# Patient Record
Sex: FEMALE | Race: OTHER | HISPANIC OR LATINO | URBAN - METROPOLITAN AREA
[De-identification: names, ages, dates, MRNs, and addresses within clinical notes are randomized per-mention and may not be internally consistent; named-entity substitution may affect disease eponyms.]

---

## 2024-11-04 ENCOUNTER — EMERGENCY (EMERGENCY)
Facility: HOSPITAL | Age: 19
LOS: 1 days | Discharge: HOME | End: 2024-11-04
Attending: STUDENT IN AN ORGANIZED HEALTH CARE EDUCATION/TRAINING PROGRAM
Payer: COMMERCIAL

## 2024-11-04 VITALS
DIASTOLIC BLOOD PRESSURE: 60 MMHG | HEIGHT: 61.81 IN | TEMPERATURE: 98 F | WEIGHT: 128.75 LBS | OXYGEN SATURATION: 99 % | SYSTOLIC BLOOD PRESSURE: 101 MMHG | HEART RATE: 71 BPM | RESPIRATION RATE: 16 BRPM

## 2024-11-04 VITALS
OXYGEN SATURATION: 97 % | DIASTOLIC BLOOD PRESSURE: 63 MMHG | TEMPERATURE: 99 F | HEART RATE: 64 BPM | SYSTOLIC BLOOD PRESSURE: 105 MMHG | RESPIRATION RATE: 16 BRPM

## 2024-11-04 PROBLEM — Z00.00 ENCOUNTER FOR PREVENTIVE HEALTH EXAMINATION: Status: ACTIVE | Noted: 2024-11-04

## 2024-11-04 LAB
ALBUMIN SERPL ELPH-MCNC: 4 G/DL — SIGNIFICANT CHANGE UP (ref 3.3–5.2)
ALP SERPL-CCNC: 72 U/L — SIGNIFICANT CHANGE UP (ref 40–120)
ALT FLD-CCNC: 11 U/L — SIGNIFICANT CHANGE UP
ANION GAP SERPL CALC-SCNC: 12 MMOL/L — SIGNIFICANT CHANGE UP (ref 5–17)
APPEARANCE UR: CLEAR — SIGNIFICANT CHANGE UP
AST SERPL-CCNC: 16 U/L — SIGNIFICANT CHANGE UP
BASOPHILS # BLD AUTO: 0.03 K/UL — SIGNIFICANT CHANGE UP (ref 0–0.2)
BASOPHILS NFR BLD AUTO: 0.4 % — SIGNIFICANT CHANGE UP (ref 0–2)
BILIRUB SERPL-MCNC: 0.5 MG/DL — SIGNIFICANT CHANGE UP (ref 0.4–2)
BILIRUB UR-MCNC: NEGATIVE — SIGNIFICANT CHANGE UP
BLD GP AB SCN SERPL QL: SIGNIFICANT CHANGE UP
BUN SERPL-MCNC: 7.4 MG/DL — LOW (ref 8–20)
CALCIUM SERPL-MCNC: 8.4 MG/DL — SIGNIFICANT CHANGE UP (ref 8.4–10.5)
CHLORIDE SERPL-SCNC: 102 MMOL/L — SIGNIFICANT CHANGE UP (ref 96–108)
CO2 SERPL-SCNC: 23 MMOL/L — SIGNIFICANT CHANGE UP (ref 22–29)
COLOR SPEC: YELLOW — SIGNIFICANT CHANGE UP
CREAT SERPL-MCNC: 0.5 MG/DL — SIGNIFICANT CHANGE UP (ref 0.5–1.3)
DIFF PNL FLD: NEGATIVE — SIGNIFICANT CHANGE UP
EGFR: 138 ML/MIN/1.73M2 — SIGNIFICANT CHANGE UP
EOSINOPHIL # BLD AUTO: 0.34 K/UL — SIGNIFICANT CHANGE UP (ref 0–0.5)
EOSINOPHIL NFR BLD AUTO: 4.8 % — SIGNIFICANT CHANGE UP (ref 0–6)
GLUCOSE SERPL-MCNC: 91 MG/DL — SIGNIFICANT CHANGE UP (ref 70–99)
GLUCOSE UR QL: NEGATIVE MG/DL — SIGNIFICANT CHANGE UP
HCG SERPL-ACNC: 6866 MIU/ML — HIGH
HCT VFR BLD CALC: 36.9 % — SIGNIFICANT CHANGE UP (ref 34.5–45)
HGB BLD-MCNC: 12.8 G/DL — SIGNIFICANT CHANGE UP (ref 11.5–15.5)
IMM GRANULOCYTES NFR BLD AUTO: 0.4 % — SIGNIFICANT CHANGE UP (ref 0–0.9)
KETONES UR-MCNC: NEGATIVE MG/DL — SIGNIFICANT CHANGE UP
LEUKOCYTE ESTERASE UR-ACNC: NEGATIVE — SIGNIFICANT CHANGE UP
LYMPHOCYTES # BLD AUTO: 1.9 K/UL — SIGNIFICANT CHANGE UP (ref 1–3.3)
LYMPHOCYTES # BLD AUTO: 26.7 % — SIGNIFICANT CHANGE UP (ref 13–44)
MCHC RBC-ENTMCNC: 30.1 PG — SIGNIFICANT CHANGE UP (ref 27–34)
MCHC RBC-ENTMCNC: 34.7 G/DL — SIGNIFICANT CHANGE UP (ref 32–36)
MCV RBC AUTO: 86.8 FL — SIGNIFICANT CHANGE UP (ref 80–100)
MONOCYTES # BLD AUTO: 0.51 K/UL — SIGNIFICANT CHANGE UP (ref 0–0.9)
MONOCYTES NFR BLD AUTO: 7.2 % — SIGNIFICANT CHANGE UP (ref 2–14)
NEUTROPHILS # BLD AUTO: 4.31 K/UL — SIGNIFICANT CHANGE UP (ref 1.8–7.4)
NEUTROPHILS NFR BLD AUTO: 60.5 % — SIGNIFICANT CHANGE UP (ref 43–77)
NITRITE UR-MCNC: NEGATIVE — SIGNIFICANT CHANGE UP
PH UR: 7.5 — SIGNIFICANT CHANGE UP (ref 5–8)
PLATELET # BLD AUTO: 272 K/UL — SIGNIFICANT CHANGE UP (ref 150–400)
POTASSIUM SERPL-MCNC: 3.5 MMOL/L — SIGNIFICANT CHANGE UP (ref 3.5–5.3)
POTASSIUM SERPL-SCNC: 3.5 MMOL/L — SIGNIFICANT CHANGE UP (ref 3.5–5.3)
PROT SERPL-MCNC: 6.5 G/DL — LOW (ref 6.6–8.7)
PROT UR-MCNC: NEGATIVE MG/DL — SIGNIFICANT CHANGE UP
RBC # BLD: 4.25 M/UL — SIGNIFICANT CHANGE UP (ref 3.8–5.2)
RBC # FLD: 12.4 % — SIGNIFICANT CHANGE UP (ref 10.3–14.5)
SODIUM SERPL-SCNC: 137 MMOL/L — SIGNIFICANT CHANGE UP (ref 135–145)
SP GR SPEC: 1.01 — SIGNIFICANT CHANGE UP (ref 1–1.03)
UROBILINOGEN FLD QL: 1 MG/DL — SIGNIFICANT CHANGE UP (ref 0.2–1)
WBC # BLD: 7.12 K/UL — SIGNIFICANT CHANGE UP (ref 3.8–10.5)
WBC # FLD AUTO: 7.12 K/UL — SIGNIFICANT CHANGE UP (ref 3.8–10.5)

## 2024-11-04 PROCEDURE — 85025 COMPLETE CBC W/AUTO DIFF WBC: CPT

## 2024-11-04 PROCEDURE — 76817 TRANSVAGINAL US OBSTETRIC: CPT | Mod: 26

## 2024-11-04 PROCEDURE — 81003 URINALYSIS AUTO W/O SCOPE: CPT

## 2024-11-04 PROCEDURE — 99284 EMERGENCY DEPT VISIT MOD MDM: CPT

## 2024-11-04 PROCEDURE — 86900 BLOOD TYPING SEROLOGIC ABO: CPT

## 2024-11-04 PROCEDURE — 84702 CHORIONIC GONADOTROPIN TEST: CPT

## 2024-11-04 PROCEDURE — 87086 URINE CULTURE/COLONY COUNT: CPT

## 2024-11-04 PROCEDURE — 80053 COMPREHEN METABOLIC PANEL: CPT

## 2024-11-04 PROCEDURE — 99284 EMERGENCY DEPT VISIT MOD MDM: CPT | Mod: 25

## 2024-11-04 PROCEDURE — 86901 BLOOD TYPING SEROLOGIC RH(D): CPT

## 2024-11-04 PROCEDURE — T1013: CPT

## 2024-11-04 PROCEDURE — 96374 THER/PROPH/DIAG INJ IV PUSH: CPT

## 2024-11-04 PROCEDURE — 76817 TRANSVAGINAL US OBSTETRIC: CPT

## 2024-11-04 PROCEDURE — 76801 OB US < 14 WKS SINGLE FETUS: CPT

## 2024-11-04 PROCEDURE — 36415 COLL VENOUS BLD VENIPUNCTURE: CPT

## 2024-11-04 PROCEDURE — 76801 OB US < 14 WKS SINGLE FETUS: CPT | Mod: 26

## 2024-11-04 PROCEDURE — 86850 RBC ANTIBODY SCREEN: CPT

## 2024-11-04 RX ORDER — METOCLOPRAMIDE HCL 10 MG
10 TABLET ORAL ONCE
Refills: 0 | Status: COMPLETED | OUTPATIENT
Start: 2024-11-04 | End: 2024-11-04

## 2024-11-04 RX ORDER — ACETAMINOPHEN 500 MG
650 TABLET ORAL ONCE
Refills: 0 | Status: COMPLETED | OUTPATIENT
Start: 2024-11-04 | End: 2024-11-04

## 2024-11-04 RX ADMIN — Medication 650 MILLIGRAM(S): at 16:45

## 2024-11-04 RX ADMIN — Medication 10 MILLIGRAM(S): at 16:45

## 2024-11-04 NOTE — ED ADULT NURSE NOTE - NS ED NURSE IV DC DT
DC Summary sent to Tammie Vegas from [facility] via encrypted e-mail.   04-Nov-2024 22:46

## 2024-11-04 NOTE — ED PROVIDER NOTE - ATTENDING APP SHARED VISIT CONTRIBUTION OF CARE
18yo female  LMP beginning of september presents with lower abd pain and nausea. PT with +home preg test. pt with early pregnancy well appearing, symptomatically improved, follow up with OB given return precautions

## 2024-11-04 NOTE — ED PROVIDER NOTE - NSFOLLOWUPINSTRUCTIONS_ED_ALL_ED_FT
Take Tylenol over the counter for pain as needed. Do not take ibuprofen. Start taking prenatal vitamins daily. Call to make an appointment to follow up with your OBGYN within 1 week. Return to ER if you develop severe pain, heavy vaginal bleeding or other worsening symptoms.     [place] Tylenol sin receta para el dolor según sea necesario. No tome ibuprofeno. Empiece a benigno vitaminas prenatales a diario. Llame para programar hal bart para realizar un seguimiento con randall obstetra y ginecólogo dentro de 1 semana. Regrese a la gwendolyn de emergencias si presenta dolor intenso, sangrado vaginal abundante u otros síntomas que empeoran.

## 2024-11-04 NOTE — ED PROVIDER NOTE - PATIENT PORTAL LINK FT
You can access the FollowMyHealth Patient Portal offered by [facility] by registering at the following website: [email or web address]. By joining [employer]’s FollowMyHealth portal, you will also be able to view your health information using other applications (apps) compatible with our system.

## 2024-11-04 NOTE — ED ADULT NURSE NOTE - DATE/TIME OF ACCEPTANCE
[FreeTextEntry1] : IMPRESSION:\par 1. Probable syncopal episode\par 1. Chronic atrial fibrillation with moderate ventricular response \par 2. History of mitral and tricuspid insufficiency by echocardiography\par 3. hypertension well-controlled \par \par  Plan:\par 1.Patient will phone me to advise of her normal beta blocker dose at which point will decrease again in half for the time being\par 2. Have discussed the issue of electrophysiologic study with an eye towards pacing and patient is agreeable.
04-Nov-2024 15:52

## 2024-11-04 NOTE — ED PROVIDER NOTE - OBJECTIVE STATEMENT
18 y/o pregnant female, LMP beginning of 2024, , with PMHx of fibroadenomas s/p left breast surgery, presents with headache and lower abdominal pain. +Nausea. Reports positive home pregnancy test yesterday. Has appt with OBGYN in 3 weeks (). Denies fever, chills, dizziness, shortness of breath, chest pain, vomiting, dysuria, hematuria or vaginal bleeding.

## 2024-11-04 NOTE — ED PROVIDER NOTE - PROGRESS NOTE DETAILS
Lab results reviewed: hcg 6866, CMP with no significant abnormalities; CBC wnl; UA negative for blood or infection. Sono shows intrauterine gestational sac. Likely early pregnancy. Patient well appearing, NAD, non-toxic appearing. Patient reports improvement in symptoms. No further ED workup indicated at this time. Supportive care. Encouraged to take daily prenatal vitamins - patient declined prescription. Follow up with obgyn within 1 week. Return precautions discussed. Patient verbally demonstrated understanding of results and plan. Patient stable for discharge.

## 2024-11-04 NOTE — ED ADULT TRIAGE NOTE - CHIEF COMPLAINT QUOTE
Pt with LMP in the beginning of Sept and positive pregnancy test comes in today c/o lower abdominal cramping. Denies bleeding.

## 2024-11-04 NOTE — ED ADULT NURSE NOTE - OBJECTIVE STATEMENT
Pt A&OX4. Came in w/ c/o lower abdominal pain and headache, LMP early september as per patient. Pt had an at home positive pregnancy test. Denies bleeding, NV/D, urinary symptoms, chest pain, palpitations, SOB. VS stable, RR even and unlabored, safety maintained.

## 2024-11-04 NOTE — ED PROVIDER NOTE - CLINICAL SUMMARY MEDICAL DECISION MAKING FREE TEXT BOX
20 y/o pregnant female, LMP beginning of 2024, , with PMHx of fibroadenomas s/p left breast surgery, presents with headache and lower abdominal pain. Abdomen soft, non-tender. Will treat symptomatically and obtain labs/sono. Reassess.

## 2024-11-06 LAB
CULTURE RESULTS: SIGNIFICANT CHANGE UP
SPECIMEN SOURCE: SIGNIFICANT CHANGE UP

## 2024-11-07 DIAGNOSIS — R11.0 NAUSEA: ICD-10-CM

## 2024-11-07 DIAGNOSIS — R51.9 HEADACHE, UNSPECIFIED: ICD-10-CM

## 2024-11-07 DIAGNOSIS — R10.30 LOWER ABDOMINAL PAIN, UNSPECIFIED: ICD-10-CM

## 2024-11-07 DIAGNOSIS — O20.8 OTHER HEMORRHAGE IN EARLY PREGNANCY: ICD-10-CM

## 2024-11-07 DIAGNOSIS — O26.891 OTHER SPECIFIED PREGNANCY RELATED CONDITIONS, FIRST TRIMESTER: ICD-10-CM

## 2024-11-07 DIAGNOSIS — Z3A.00 WEEKS OF GESTATION OF PREGNANCY NOT SPECIFIED: ICD-10-CM

## 2024-11-12 ENCOUNTER — EMERGENCY (EMERGENCY)
Facility: HOSPITAL | Age: 19
LOS: 1 days | Discharge: HOME | End: 2024-11-12
Attending: EMERGENCY MEDICINE
Payer: COMMERCIAL

## 2024-11-12 VITALS
TEMPERATURE: 97 F | DIASTOLIC BLOOD PRESSURE: 71 MMHG | OXYGEN SATURATION: 98 % | HEART RATE: 80 BPM | RESPIRATION RATE: 16 BRPM | HEIGHT: 61.81 IN | SYSTOLIC BLOOD PRESSURE: 111 MMHG | WEIGHT: 113.54 LBS

## 2024-11-12 LAB
ALBUMIN SERPL ELPH-MCNC: 4.3 G/DL — SIGNIFICANT CHANGE UP (ref 3.3–5.2)
ALP SERPL-CCNC: 70 U/L — SIGNIFICANT CHANGE UP (ref 40–120)
ALT FLD-CCNC: 10 U/L — SIGNIFICANT CHANGE UP
ANION GAP SERPL CALC-SCNC: 14 MMOL/L — SIGNIFICANT CHANGE UP (ref 5–17)
APPEARANCE UR: CLEAR — SIGNIFICANT CHANGE UP
AST SERPL-CCNC: 14 U/L — SIGNIFICANT CHANGE UP
BACTERIA # UR AUTO: ABNORMAL /HPF
BASOPHILS # BLD AUTO: 0.04 K/UL — SIGNIFICANT CHANGE UP (ref 0–0.2)
BASOPHILS NFR BLD AUTO: 0.4 % — SIGNIFICANT CHANGE UP (ref 0–2)
BILIRUB SERPL-MCNC: 0.5 MG/DL — SIGNIFICANT CHANGE UP (ref 0.4–2)
BILIRUB UR-MCNC: NEGATIVE — SIGNIFICANT CHANGE UP
BUN SERPL-MCNC: 7.9 MG/DL — LOW (ref 8–20)
CALCIUM SERPL-MCNC: 9.3 MG/DL — SIGNIFICANT CHANGE UP (ref 8.4–10.5)
CAST: 0 /LPF — SIGNIFICANT CHANGE UP (ref 0–4)
CHLORIDE SERPL-SCNC: 103 MMOL/L — SIGNIFICANT CHANGE UP (ref 96–108)
CO2 SERPL-SCNC: 22 MMOL/L — SIGNIFICANT CHANGE UP (ref 22–29)
COLOR SPEC: YELLOW — SIGNIFICANT CHANGE UP
CREAT SERPL-MCNC: 0.46 MG/DL — LOW (ref 0.5–1.3)
DIFF PNL FLD: NEGATIVE — SIGNIFICANT CHANGE UP
EGFR: 140 ML/MIN/1.73M2 — SIGNIFICANT CHANGE UP
EOSINOPHIL # BLD AUTO: 0.48 K/UL — SIGNIFICANT CHANGE UP (ref 0–0.5)
EOSINOPHIL NFR BLD AUTO: 4.8 % — SIGNIFICANT CHANGE UP (ref 0–6)
GLUCOSE SERPL-MCNC: 84 MG/DL — SIGNIFICANT CHANGE UP (ref 70–99)
GLUCOSE UR QL: NEGATIVE MG/DL — SIGNIFICANT CHANGE UP
HCG SERPL-ACNC: HIGH MIU/ML
HCT VFR BLD CALC: 40.3 % — SIGNIFICANT CHANGE UP (ref 34.5–45)
HGB BLD-MCNC: 14 G/DL — SIGNIFICANT CHANGE UP (ref 11.5–15.5)
IMM GRANULOCYTES NFR BLD AUTO: 0.8 % — SIGNIFICANT CHANGE UP (ref 0–0.9)
KETONES UR-MCNC: NEGATIVE MG/DL — SIGNIFICANT CHANGE UP
LEUKOCYTE ESTERASE UR-ACNC: ABNORMAL
LIDOCAIN IGE QN: 21 U/L — LOW (ref 22–51)
LYMPHOCYTES # BLD AUTO: 1.98 K/UL — SIGNIFICANT CHANGE UP (ref 1–3.3)
LYMPHOCYTES # BLD AUTO: 19.6 % — SIGNIFICANT CHANGE UP (ref 13–44)
MCHC RBC-ENTMCNC: 30 PG — SIGNIFICANT CHANGE UP (ref 27–34)
MCHC RBC-ENTMCNC: 34.7 G/DL — SIGNIFICANT CHANGE UP (ref 32–36)
MCV RBC AUTO: 86.3 FL — SIGNIFICANT CHANGE UP (ref 80–100)
MONOCYTES # BLD AUTO: 0.5 K/UL — SIGNIFICANT CHANGE UP (ref 0–0.9)
MONOCYTES NFR BLD AUTO: 5 % — SIGNIFICANT CHANGE UP (ref 2–14)
NEUTROPHILS # BLD AUTO: 7.02 K/UL — SIGNIFICANT CHANGE UP (ref 1.8–7.4)
NEUTROPHILS NFR BLD AUTO: 69.4 % — SIGNIFICANT CHANGE UP (ref 43–77)
NITRITE UR-MCNC: NEGATIVE — SIGNIFICANT CHANGE UP
PH UR: 6 — SIGNIFICANT CHANGE UP (ref 5–8)
PLATELET # BLD AUTO: 278 K/UL — SIGNIFICANT CHANGE UP (ref 150–400)
POTASSIUM SERPL-MCNC: 4.2 MMOL/L — SIGNIFICANT CHANGE UP (ref 3.5–5.3)
POTASSIUM SERPL-SCNC: 4.2 MMOL/L — SIGNIFICANT CHANGE UP (ref 3.5–5.3)
PROT SERPL-MCNC: 7.2 G/DL — SIGNIFICANT CHANGE UP (ref 6.6–8.7)
PROT UR-MCNC: SIGNIFICANT CHANGE UP MG/DL
RBC # BLD: 4.67 M/UL — SIGNIFICANT CHANGE UP (ref 3.8–5.2)
RBC # FLD: 12.6 % — SIGNIFICANT CHANGE UP (ref 10.3–14.5)
RBC CASTS # UR COMP ASSIST: 2 /HPF — SIGNIFICANT CHANGE UP (ref 0–4)
SODIUM SERPL-SCNC: 138 MMOL/L — SIGNIFICANT CHANGE UP (ref 135–145)
SP GR SPEC: 1.03 — SIGNIFICANT CHANGE UP (ref 1–1.03)
SQUAMOUS # UR AUTO: 13 /HPF — HIGH (ref 0–5)
UROBILINOGEN FLD QL: 1 MG/DL — SIGNIFICANT CHANGE UP (ref 0.2–1)
WBC # BLD: 10.1 K/UL — SIGNIFICANT CHANGE UP (ref 3.8–10.5)
WBC # FLD AUTO: 10.1 K/UL — SIGNIFICANT CHANGE UP (ref 3.8–10.5)
WBC UR QL: 12 /HPF — HIGH (ref 0–5)

## 2024-11-12 PROCEDURE — 76801 OB US < 14 WKS SINGLE FETUS: CPT | Mod: 26

## 2024-11-12 PROCEDURE — 36415 COLL VENOUS BLD VENIPUNCTURE: CPT

## 2024-11-12 PROCEDURE — 80053 COMPREHEN METABOLIC PANEL: CPT

## 2024-11-12 PROCEDURE — 85025 COMPLETE CBC W/AUTO DIFF WBC: CPT

## 2024-11-12 PROCEDURE — 76801 OB US < 14 WKS SINGLE FETUS: CPT

## 2024-11-12 PROCEDURE — 83690 ASSAY OF LIPASE: CPT

## 2024-11-12 PROCEDURE — T1013: CPT

## 2024-11-12 PROCEDURE — 76817 TRANSVAGINAL US OBSTETRIC: CPT

## 2024-11-12 PROCEDURE — 99284 EMERGENCY DEPT VISIT MOD MDM: CPT

## 2024-11-12 PROCEDURE — 76817 TRANSVAGINAL US OBSTETRIC: CPT | Mod: 26

## 2024-11-12 PROCEDURE — 84702 CHORIONIC GONADOTROPIN TEST: CPT

## 2024-11-12 PROCEDURE — 81001 URINALYSIS AUTO W/SCOPE: CPT

## 2024-11-12 RX ORDER — ONDANSETRON HYDROCHLORIDE 2 MG/ML
4 INJECTION, SOLUTION INTRAMUSCULAR; INTRAVENOUS ONCE
Refills: 0 | Status: COMPLETED | OUTPATIENT
Start: 2024-11-12 | End: 2024-11-12

## 2024-11-12 RX ORDER — CEPHALEXIN 125 MG/5ML
1 SUSPENSION, RECONSTITUTED, ORAL (ML) ORAL
Qty: 20 | Refills: 0
Start: 2024-11-12 | End: 2024-11-16

## 2024-11-12 RX ORDER — ALUMINA, MAGNESIA, AND SIMETHICONE 2400; 2400; 240 MG/30ML; MG/30ML; MG/30ML
1 SUSPENSION ORAL
Qty: 12 | Refills: 0
Start: 2024-11-12 | End: 2024-11-14

## 2024-11-12 RX ORDER — CEPHALEXIN 125 MG/5ML
500 SUSPENSION, RECONSTITUTED, ORAL (ML) ORAL
Refills: 0 | Status: DISCONTINUED | OUTPATIENT
Start: 2024-11-12 | End: 2024-11-20

## 2024-11-12 RX ORDER — ONDANSETRON HYDROCHLORIDE 2 MG/ML
1 INJECTION, SOLUTION INTRAMUSCULAR; INTRAVENOUS
Qty: 12 | Refills: 0
Start: 2024-11-12 | End: 2024-11-14

## 2024-11-12 RX ORDER — MAGNESIUM, ALUMINUM HYDROXIDE 200-200 MG
30 TABLET,CHEWABLE ORAL ONCE
Refills: 0 | Status: COMPLETED | OUTPATIENT
Start: 2024-11-12 | End: 2024-11-12

## 2024-11-12 RX ORDER — SODIUM CHLORIDE 9 MG/ML
1000 INJECTION, SOLUTION INTRAMUSCULAR; INTRAVENOUS; SUBCUTANEOUS ONCE
Refills: 0 | Status: COMPLETED | OUTPATIENT
Start: 2024-11-12 | End: 2024-11-12

## 2024-11-12 RX ORDER — ACETAMINOPHEN 500 MG
650 TABLET ORAL ONCE
Refills: 0 | Status: COMPLETED | OUTPATIENT
Start: 2024-11-12 | End: 2024-11-12

## 2024-11-12 RX ORDER — CEPHALEXIN 500 MG
1 CAPSULE ORAL
Qty: 20 | Refills: 0
Start: 2024-11-12 | End: 2024-12-27

## 2024-11-12 RX ADMIN — SODIUM CHLORIDE 1000 MILLILITER(S): 9 INJECTION, SOLUTION INTRAMUSCULAR; INTRAVENOUS; SUBCUTANEOUS at 17:39

## 2024-11-12 RX ADMIN — Medication 30 MILLILITER(S): at 13:29

## 2024-11-12 RX ADMIN — ONDANSETRON HYDROCHLORIDE 4 MILLIGRAM(S): 2 INJECTION, SOLUTION INTRAMUSCULAR; INTRAVENOUS at 13:29

## 2024-11-12 RX ADMIN — Medication 650 MILLIGRAM(S): at 13:28

## 2024-11-12 RX ADMIN — Medication 500 MILLIGRAM(S): at 18:44

## 2024-11-12 NOTE — ED PROVIDER NOTE - CLINICAL SUMMARY MEDICAL DECISION MAKING FREE TEXT BOX
PT with SPMHX being approx 5wk preg LMP beginning of Sept 2024,    presents to the ED with complaint of upper abd pain. Pt states that she has been having intermient nausea over the last wk but that today and yesterday she has been experiencing increased upper abd pain and nausea. Pt dines  vag bleeding, vag discharge, urinary symptoms, abd cramping, Pt with no acute findings on exam, Pt trialed on PO meds. PT with SPMHX being approx 5wk preg LMP beginning of Sept 2024,    presents to the ED with complaint of upper abd pain. Pt states that she has been having interment nausea over the last wk but that today and yesterday she has been experiencing increased upper abd pain and nausea. Pt dines  vag bleeding, vag discharge, urinary symptoms, abd cramping, Pt with no acute findings on exam,  Pt with IUP, UTI that will be dc home on PO Abx, Pt informed that they will be contacted if after culture is resulted there is a need for change in ABx or return to the ED. PT with stable VS, no acute distress, non toxic appearing, tolerating PO in the ED, Pt dc home with supportive care, followup to PCP, Pt educated about when to return to the ED if needed. PT verbalizes that he understands all instructions and results. Pt informed that ED is open and available 24/7 365 days a yr, encouraged to return to the ED if they have any change in condition, or feel the need for revaluation.    utilized to obtain History, ROS, Physical Exam, explanations of results and plan of care, as well as follow up instructions.

## 2024-11-12 NOTE — ED PROVIDER NOTE - OBJECTIVE STATEMENT
PT with SPMHX being approx 5wk preg LMP beginning of Sept 2024,    presents to the ED with complaint of upper abd pain. Pt states that she has been having intermient nausea over the last wk but that today and yesterday she has been experiencing increased upper abd pain and nausea. Pt dines  vag bleeding, vag discharge, urinary symptoms, abd cramping, PT with SPMHX being approx 5wk preg LMP beginning of Sept 2024,    presents to the ED with complaint of upper abd pain. Pt states that she has been having interment nausea over the last wk but that today and yesterday she has been experiencing increased upper abd pain and nausea. Pt dines  vag bleeding, vag discharge, urinary symptoms, abd cramping,

## 2024-11-12 NOTE — ED PROVIDER NOTE - NSFOLLOWUPINSTRUCTIONS_ED_ALL_ED_FT
Patient education: Nausea and vomiting in babies and children (The Basics)  Written by the doctors and editors at [facility]  Please read the Disclaimer at the end of this page.    What are nausea and vomiting?    Nausea is the feeling your child has when they think that they might throw up. Your child might say that they have a "stomach ache" or feel "sick to their stomach." Vomiting is when they actually throw up.    Vomiting is different than spitting up, but, sometimes, people use these terms interchangeably. Babies often spit up with a burp after a feeding. This is known as "regurgitation" or "reflux."    What can cause nausea and vomiting?    Nausea and vomiting are common in children. They are usually part of a mild, short-term illness, often caused by a virus. The possible causes of vomiting depend on the child's age.    In newborns and very young babies (under 3 months old):    ?Spitting up is common in babies and is normal. Vomiting is usually more forceful, and the baby might seem sick.    ?Repeated vomiting with force, or having symptoms such as a fever of 100.4°F (38°C) or higher, can be a sign of a serious problem. Examples include a blockage in the stomach or intestine, or an infection.    In older babies, children, and teens:    ?The most common cause is an infection of the stomach and intestines, usually caused by a virus. The medical name for this is "gastroenteritis." This infection is easily spread from person to person.    ?Food poisoning can happen if your child eats food that has gone bad or that hasn't been washed or cooked enough. Food poisoning often also causes diarrhea.    ?Other illnesses that can cause vomiting include an ear infection, strep throat, migraines, or inflammation or blockage in the intestines.    ?Other things can also cause vomiting in teens. These include frequent use of marijuana or other substances, or pregnancy.    How is nausea and vomiting treated?    If your child's nausea and vomiting lasts for more than a day or so, the doctor might need to:    ?Change your child's diet    ?Give your child fluids through a thin tube that goes into a vein, called an "IV"    ?Do tests to help find out the cause, such as:    •Blood or urine tests    •Imaging tests – These include X-ray, ultrasound, MRI, and others. These tests create pictures of the inside of the body.    What can I do to help my child feel better?    You can:    ?Offer your child fluids, starting with small amounts. They can drink more as they start to feel better. If your child is vomiting a lot, you can try:    •For children less than 1 year old – Start by giving small amounts of fluids (1 to 2 teaspoons, or 5 to 10 mL) every 10 to 15 minutes. You can give breast milk, baby formula, or an oral electrolyte solution (sample brand name: Pedialyte). If you are breastfeeding, you can try to breastfeed more frequently and for a shorter amount of time. If you are not breastfeeding, you can give the fluids in a bottle, or with a spoon or syringe.    •For children over 1 year old – Have them sip small amounts of an oral electrolyte solution. If your child won't drink that, try a sports drink or juice mixed with an equal amount of water. For younger children, start by giving a few teaspoons (5 to 10 mL) every 10 to 15 minutes. Older children can start with a few sips and slowly increase how much they drink as they feel ready.    If your child vomits after drinking, wait 30 minutes and try again. If they can keep these small amounts down without vomiting, gradually increase the amount. If they do not vomit after 2 to 3 hours, you can go back to normal feeding.    ?If your child has been vomiting, avoid giving them solid foods for a few hours. If they start to feel hungry and are able to drink fluids, offer foods that have a lot of fluid in them. Good examples are soup, gelatin, and ice pops. If this goes well, offer soft, bland foods if they feel ready. Foods that are high in carbohydrates ("carbs"), like bread or saltine crackers, can help settle the stomach. Other good foods to eat are noodles, rice, oatmeal, soup, soft vegetables, fruits, or lean meats. Avoid foods and drinks with a lot of sugar.    ?Talk to your child's doctor or nurse before giving your child any over-the-counter medicines for diarrhea or vomiting.    When should I call the doctor?    Call for emergency help right away (in the [place] and [place], call 9-1-1) if your child:    ?Won't wake up    ?Seems very sleepy, and has 1 or more of these signs of severe fluid loss:    •Skin is mottled and cool, and hands and feet are blue    •Eyes are sunken    •No urine for 24 hours    •The soft spot on their head is sunken (for babies)    Call the doctor or nurse for advice if your child:    ?Has a severe belly ache    ?Can't keep any fluids down, has not had anything to drink in many hours, or has trouble feeding normally    ?Has vomit that looks green or has blood in it, or has bloody diarrhea    ?Continues to vomit for more than 24 hours    ?Is not as alert as usual, is very sleepy, is much less active, or cries all the time    ?Hasn't needed to urinate in the past 6 to 8 hours (in older children), or hasn't had a wet diaper for 4 to 6 hours (in babies and younger children)    ?Has dark-colored urine    ?Has a dry mouth, or few or no tears when they cry    ?Has a fever of 100.4°F (38°C) or higher that lasts more than 2 to 3 days    ?Has diarrhea that lasts more than a few days, or has blood in their bowel movements    Can nausea and vomiting be prevented?    It depends on the cause. You can do things to lower the risk of getting or spreading an infection that causes nausea and vomiting:    ?Wash your hands often with soap and water, and make sure that your child washes their hands (figure 1). This is especially important if you have been around someone who is sick. It's also important to wash your hands before you eat and after using the bathroom or changing diapers.    ?Pay attention to food safety. This includes avoiding unpasteurized milk, washing fruits and vegetables before eating them, not eating undercooked food, and washing all cooking utensils. Wash hands thoroughly after touching raw food.

## 2024-11-12 NOTE — ED PROVIDER NOTE - ATTENDING APP SHARED VISIT CONTRIBUTION OF CARE
mild abd painl, nausea/vomiting, in setting of early pregnancy; well appearing, NAD; no icterus; abd soft nt nd; labs and imaging reviewed; agree with acp plan of care    This was a shared visit with MATTHEW. I reviewed and verified the documentation and independently performed the documented history/exam/mdm.

## 2024-11-12 NOTE — ED PROVIDER NOTE - ADDITIONAL NOTES AND INSTRUCTIONS:
PT was evaluated At [facility] ED and was found to have a condition that warranted time of to rest and heal from WORK/SCHOOL.   Edmund Patten PA-C

## 2024-11-12 NOTE — ED ADULT NURSE NOTE - NSSEPSISNEWALTERMENTAL_ED_A_ED
[Well-appearing] : well-appearing [Normocephalic] : normocephalic [No dysmorphic facial features] : no dysmorphic facial features [No ocular abnormalities] : no ocular abnormalities [Neck supple] : neck supple [Straight] : straight [No lakisha or dimples] : no lakisha or dimples [No deformities] : no deformities [Alert] : alert [Well related, good eye contact] : well related, good eye contact No [Conversant] : conversant [Normal speech and language] : normal speech and language [Follows instructions well] : follows instructions well [VFF] : VFF [Pupils reactive to light and accommodation] : pupils reactive to light and accommodation [Full extraocular movements] : full extraocular movements [No nystagmus] : no nystagmus [No papilledema] : no papilledema [Normal facial sensation to light touch] : normal facial sensation to light touch [No facial asymmetry or weakness] : no facial asymmetry or weakness [Gross hearing intact] : gross hearing intact [Equal palate elevation] : equal palate elevation [Good shoulder shrug] : good shoulder shrug [Normal tongue movement] : normal tongue movement [Midline tongue, no fasciculations] : midline tongue, no fasciculations [Normal axial and appendicular muscle tone] : normal axial and appendicular muscle tone [Gets up on table without difficulty] : gets up on table without difficulty [No pronator drift] : no pronator drift [Normal finger tapping and fine finger movements] : normal finger tapping and fine finger movements [No abnormal involuntary movements] : no abnormal involuntary movements [5/5 strength in proximal and distal muscles of arms and legs] : 5/5 strength in proximal and distal muscles of arms and legs [Walks and runs well] : walks and runs well [Able to do deep knee bend] : able to do deep knee bend [Able to walk on heels] : able to walk on heels [Able to walk on toes] : able to walk on toes [2+ biceps] : 2+ biceps [Triceps] : triceps [Knee jerks] : knee jerks [Ankle jerks] : ankle jerks [No ankle clonus] : no ankle clonus [Bilaterally] : bilaterally [Localizes LT and temperature] : localizes LT and temperature [No dysmetria on FTNT] : no dysmetria on FTNT [Good walking balance] : good walking balance [Normal gait] : normal gait [Able to tandem well] : able to tandem well [Negative Romberg] : negative Romberg

## 2024-11-12 NOTE — ED PROVIDER NOTE - PATIENT PORTAL LINK FT
You can access the FollowMyHealth Patient Portal offered by [facility] by registering at the following website: [email or web address]. By joining [employer]’s FollowMyHealth portal, you will also be able to view your health information using other applications (apps) compatible with our system.

## 2024-11-22 ENCOUNTER — ASOB RESULT (OUTPATIENT)
Age: 19
End: 2024-11-22

## 2024-11-22 ENCOUNTER — APPOINTMENT (OUTPATIENT)
Dept: OBGYN | Facility: CLINIC | Age: 19
End: 2024-11-22
Payer: MEDICAID

## 2024-11-22 ENCOUNTER — APPOINTMENT (OUTPATIENT)
Dept: ANTEPARTUM | Facility: CLINIC | Age: 19
End: 2024-11-22
Payer: MEDICAID

## 2024-11-22 VITALS
WEIGHT: 130 LBS | SYSTOLIC BLOOD PRESSURE: 112 MMHG | BODY MASS INDEX: 23.92 KG/M2 | HEIGHT: 62 IN | DIASTOLIC BLOOD PRESSURE: 62 MMHG

## 2024-11-22 DIAGNOSIS — Z34.91 ENCOUNTER FOR SUPERVISION OF NORMAL PREGNANCY, UNSPECIFIED, FIRST TRIMESTER: ICD-10-CM

## 2024-11-22 DIAGNOSIS — Z78.9 OTHER SPECIFIED HEALTH STATUS: ICD-10-CM

## 2024-11-22 DIAGNOSIS — O21.9 VOMITING OF PREGNANCY, UNSPECIFIED: ICD-10-CM

## 2024-11-22 DIAGNOSIS — Z72.3 LACK OF PHYSICAL EXERCISE: ICD-10-CM

## 2024-11-22 DIAGNOSIS — Z86.018 PERSONAL HISTORY OF OTHER BENIGN NEOPLASM: ICD-10-CM

## 2024-11-22 PROCEDURE — 76817 TRANSVAGINAL US OBSTETRIC: CPT

## 2024-11-22 PROCEDURE — 36415 COLL VENOUS BLD VENIPUNCTURE: CPT

## 2024-11-22 PROCEDURE — 0500F INITIAL PRENATAL CARE VISIT: CPT

## 2024-11-22 RX ORDER — ONDANSETRON 8 MG/1
8 TABLET ORAL EVERY 6 HOURS
Qty: 30 | Refills: 0 | Status: ACTIVE | COMMUNITY
Start: 2024-11-22 | End: 1900-01-01

## 2024-11-22 RX ORDER — FOLIC ACID 1 MG/1
1 TABLET ORAL DAILY
Qty: 30 | Refills: 2 | Status: ACTIVE | COMMUNITY
Start: 2024-11-22 | End: 1900-01-01

## 2024-11-22 RX ORDER — PRENATAL VIT 116/IRON/FA/DHA 28-800-200
28-0.8-235 CAPSULE ORAL DAILY
Qty: 30 | Refills: 6 | Status: ACTIVE | COMMUNITY
Start: 2024-11-22 | End: 1900-01-01

## 2024-11-25 LAB
ABO + RH PNL BLD: NORMAL
ALBUMIN SERPL ELPH-MCNC: 4.5 G/DL
ALP BLD-CCNC: 69 U/L
ALT SERPL-CCNC: 15 U/L
ANION GAP SERPL CALC-SCNC: 18 MMOL/L
AST SERPL-CCNC: 15 U/L
BASOPHILS # BLD AUTO: 0.04 K/UL
BASOPHILS NFR BLD AUTO: 0.4 %
BILIRUB SERPL-MCNC: 0.3 MG/DL
BLD GP AB SCN SERPL QL: NORMAL
BUN SERPL-MCNC: 10 MG/DL
CALCIUM SERPL-MCNC: 9.4 MG/DL
CHLORIDE SERPL-SCNC: 99 MMOL/L
CO2 SERPL-SCNC: 20 MMOL/L
CREAT SERPL-MCNC: 0.52 MG/DL
EGFR: 136 ML/MIN/1.73M2
EOSINOPHIL # BLD AUTO: 0.33 K/UL
EOSINOPHIL NFR BLD AUTO: 3.3 %
ESTIMATED AVERAGE GLUCOSE: 105 MG/DL
GLUCOSE SERPL-MCNC: 57 MG/DL
HBA1C MFR BLD HPLC: 5.3 %
HBV SURFACE AG SER QL: NONREACTIVE
HCT VFR BLD CALC: 42.2 %
HCV AB SER QL: NONREACTIVE
HCV S/CO RATIO: 0.18 S/CO
HGB A MFR BLD: 97.3 %
HGB A2 MFR BLD: 2.7 %
HGB BLD-MCNC: 14.1 G/DL
HGB FRACT BLD-IMP: NORMAL
HIV1+2 AB SPEC QL IA.RAPID: NONREACTIVE
IMM GRANULOCYTES NFR BLD AUTO: 0.5 %
LYMPHOCYTES # BLD AUTO: 2.07 K/UL
LYMPHOCYTES NFR BLD AUTO: 21 %
MAN DIFF?: NORMAL
MCHC RBC-ENTMCNC: 30.4 PG
MCHC RBC-ENTMCNC: 33.4 G/DL
MCV RBC AUTO: 90.9 FL
MEV IGG FLD QL IA: 86.9 AU/ML
MEV IGG+IGM SER-IMP: POSITIVE
MONOCYTES # BLD AUTO: 0.74 K/UL
MONOCYTES NFR BLD AUTO: 7.5 %
MUV AB SER-ACNC: POSITIVE
MUV IGG SER QL IA: 65 AU/ML
NEUTROPHILS # BLD AUTO: 6.63 K/UL
NEUTROPHILS NFR BLD AUTO: 67.3 %
PLATELET # BLD AUTO: 309 K/UL
POTASSIUM SERPL-SCNC: 4.3 MMOL/L
PROT SERPL-MCNC: 7.1 G/DL
RBC # BLD: 4.64 M/UL
RBC # FLD: 13 %
RUBV IGG FLD-ACNC: 1.83 INDEX
RUBV IGG SER-IMP: POSITIVE
SODIUM SERPL-SCNC: 138 MMOL/L
T GONDII AB SER-IMP: NEGATIVE
T GONDII AB SER-IMP: POSITIVE
T GONDII IGG SER QL: 61.1 IU/ML
T GONDII IGM SER QL: <3 AU/ML
T PALLIDUM AB SER QL IA: NEGATIVE
TSH SERPL-ACNC: 1.18 UIU/ML
VZV AB TITR SER: NEGATIVE
VZV IGG SER IF-ACNC: 0.1 S/CO
WBC # FLD AUTO: 9.86 K/UL

## 2024-11-27 LAB
B19V IGG SER QL IA: 0.31 INDEX
B19V IGG+IGM SER-IMP: NEGATIVE
B19V IGG+IGM SER-IMP: NORMAL
B19V IGM FLD-ACNC: 0.24 INDEX
B19V IGM SER-ACNC: NEGATIVE
M TB IFN-G BLD-IMP: NEGATIVE
QUANTIFERON TB PLUS MITOGEN MINUS NIL: >10 IU/ML
QUANTIFERON TB PLUS NIL: 0.03 IU/ML
QUANTIFERON TB PLUS TB1 MINUS NIL: 0 IU/ML
QUANTIFERON TB PLUS TB2 MINUS NIL: 0 IU/ML

## 2024-12-05 ENCOUNTER — APPOINTMENT (OUTPATIENT)
Dept: OBGYN | Facility: CLINIC | Age: 19
End: 2024-12-05

## 2024-12-12 ENCOUNTER — NON-APPOINTMENT (OUTPATIENT)
Age: 19
End: 2024-12-12

## 2024-12-18 ENCOUNTER — NON-APPOINTMENT (OUTPATIENT)
Age: 19
End: 2024-12-18

## 2024-12-19 ENCOUNTER — APPOINTMENT (OUTPATIENT)
Dept: ANTEPARTUM | Facility: CLINIC | Age: 19
End: 2024-12-19
Payer: MEDICAID

## 2024-12-19 ENCOUNTER — APPOINTMENT (OUTPATIENT)
Dept: OBGYN | Facility: CLINIC | Age: 19
End: 2024-12-19
Payer: MEDICAID

## 2024-12-19 ENCOUNTER — ASOB RESULT (OUTPATIENT)
Age: 19
End: 2024-12-19

## 2024-12-19 VITALS
DIASTOLIC BLOOD PRESSURE: 70 MMHG | BODY MASS INDEX: 23.76 KG/M2 | WEIGHT: 129.13 LBS | SYSTOLIC BLOOD PRESSURE: 111 MMHG | HEIGHT: 62 IN

## 2024-12-19 DIAGNOSIS — Z34.91 ENCOUNTER FOR SUPERVISION OF NORMAL PREGNANCY, UNSPECIFIED, FIRST TRIMESTER: ICD-10-CM

## 2024-12-19 LAB
APPEARANCE: CLEAR
BILIRUBIN URINE: NEGATIVE
BLOOD URINE: NEGATIVE
COLOR: YELLOW
GLUCOSE QUALITATIVE U: NEGATIVE
KETONES URINE: NEGATIVE
LEUKOCYTE ESTERASE URINE: NEGATIVE
NITRITE URINE: NEGATIVE
PH URINE: 6
PROTEIN URINE: NEGATIVE
SPECIFIC GRAVITY URINE: 1.02
UROBILINOGEN URINE: 0.2 (ref 0.2–?)

## 2024-12-19 PROCEDURE — 36415 COLL VENOUS BLD VENIPUNCTURE: CPT

## 2024-12-19 PROCEDURE — 76813 OB US NUCHAL MEAS 1 GEST: CPT

## 2024-12-19 PROCEDURE — 0502F SUBSEQUENT PRENATAL CARE: CPT

## 2024-12-23 ENCOUNTER — EMERGENCY (EMERGENCY)
Facility: HOSPITAL | Age: 19
LOS: 1 days | Discharge: HOME | End: 2024-12-23
Attending: EMERGENCY MEDICINE
Payer: COMMERCIAL

## 2024-12-23 VITALS
SYSTOLIC BLOOD PRESSURE: 115 MMHG | WEIGHT: 132.28 LBS | OXYGEN SATURATION: 95 % | HEIGHT: 61.81 IN | RESPIRATION RATE: 20 BRPM | HEART RATE: 121 BPM | TEMPERATURE: 98 F | DIASTOLIC BLOOD PRESSURE: 71 MMHG

## 2024-12-23 LAB
A VAGINAE DNA VAG QL NAA+PROBE: NORMAL
ALBUMIN SERPL ELPH-MCNC: 3.6 G/DL — SIGNIFICANT CHANGE UP (ref 3.3–5.2)
ALP SERPL-CCNC: 61 U/L — SIGNIFICANT CHANGE UP (ref 40–120)
ALT FLD-CCNC: 14 U/L — SIGNIFICANT CHANGE UP
ANION GAP SERPL CALC-SCNC: 12 MMOL/L — SIGNIFICANT CHANGE UP (ref 5–17)
APPEARANCE UR: ABNORMAL
APTT BLD: 28.2 SEC — SIGNIFICANT CHANGE UP (ref 24.5–35.6)
AST SERPL-CCNC: 14 U/L — SIGNIFICANT CHANGE UP
BACTERIA # UR AUTO: ABNORMAL /HPF
BASOPHILS # BLD AUTO: 0.04 K/UL — SIGNIFICANT CHANGE UP (ref 0–0.2)
BASOPHILS NFR BLD AUTO: 0.4 % — SIGNIFICANT CHANGE UP (ref 0–2)
BILIRUB SERPL-MCNC: <0.2 MG/DL — LOW (ref 0.4–2)
BILIRUB UR-MCNC: NEGATIVE — SIGNIFICANT CHANGE UP
BLD GP AB SCN SERPL QL: SIGNIFICANT CHANGE UP
BUN SERPL-MCNC: 5.6 MG/DL — LOW (ref 8–20)
BVAB2 DNA VAG QL NAA+PROBE: NORMAL
C KRUSEI DNA VAG QL NAA+PROBE: NEGATIVE
C TRACH RRNA SPEC QL NAA+PROBE: NEGATIVE
CALCIUM SERPL-MCNC: 8.3 MG/DL — LOW (ref 8.4–10.5)
CANDIDA DNA VAG QL NAA+PROBE: NEGATIVE
CAST: 8 /LPF — HIGH (ref 0–4)
CHLORIDE SERPL-SCNC: 100 MMOL/L — SIGNIFICANT CHANGE UP (ref 96–108)
CO2 SERPL-SCNC: 22 MMOL/L — SIGNIFICANT CHANGE UP (ref 22–29)
COLOR SPEC: ABNORMAL
CREAT SERPL-MCNC: 0.4 MG/DL — LOW (ref 0.5–1.3)
DIFF PNL FLD: ABNORMAL
EGFR: 145 ML/MIN/1.73M2 — SIGNIFICANT CHANGE UP
EOSINOPHIL # BLD AUTO: 0.17 K/UL — SIGNIFICANT CHANGE UP (ref 0–0.5)
EOSINOPHIL NFR BLD AUTO: 1.6 % — SIGNIFICANT CHANGE UP (ref 0–6)
GLUCOSE SERPL-MCNC: 77 MG/DL — SIGNIFICANT CHANGE UP (ref 70–99)
GLUCOSE UR QL: NEGATIVE MG/DL — SIGNIFICANT CHANGE UP
HCT VFR BLD CALC: 37.4 % — SIGNIFICANT CHANGE UP (ref 34.5–45)
HGB BLD-MCNC: 13.3 G/DL — SIGNIFICANT CHANGE UP (ref 11.5–15.5)
IMM GRANULOCYTES NFR BLD AUTO: 0.7 % — SIGNIFICANT CHANGE UP (ref 0–0.9)
INR BLD: 0.94 RATIO — SIGNIFICANT CHANGE UP (ref 0.85–1.16)
KETONES UR-MCNC: NEGATIVE MG/DL — SIGNIFICANT CHANGE UP
LEUKOCYTE ESTERASE UR-ACNC: ABNORMAL
LYMPHOCYTES # BLD AUTO: 2.17 K/UL — SIGNIFICANT CHANGE UP (ref 1–3.3)
LYMPHOCYTES # BLD AUTO: 20.7 % — SIGNIFICANT CHANGE UP (ref 13–44)
MCHC RBC-ENTMCNC: 31 PG — SIGNIFICANT CHANGE UP (ref 27–34)
MCHC RBC-ENTMCNC: 35.6 G/DL — SIGNIFICANT CHANGE UP (ref 32–36)
MCV RBC AUTO: 87.2 FL — SIGNIFICANT CHANGE UP (ref 80–100)
MEGA1 DNA VAG QL NAA+PROBE: NORMAL
MONOCYTES # BLD AUTO: 0.69 K/UL — SIGNIFICANT CHANGE UP (ref 0–0.9)
MONOCYTES NFR BLD AUTO: 6.6 % — SIGNIFICANT CHANGE UP (ref 2–14)
N GONORRHOEA RRNA SPEC QL NAA+PROBE: NEGATIVE
NEUTROPHILS # BLD AUTO: 7.36 K/UL — SIGNIFICANT CHANGE UP (ref 1.8–7.4)
NEUTROPHILS NFR BLD AUTO: 70 % — SIGNIFICANT CHANGE UP (ref 43–77)
NITRITE UR-MCNC: NEGATIVE — SIGNIFICANT CHANGE UP
PH UR: 6 — SIGNIFICANT CHANGE UP (ref 5–8)
PLATELET # BLD AUTO: 299 K/UL — SIGNIFICANT CHANGE UP (ref 150–400)
POTASSIUM SERPL-MCNC: 3.6 MMOL/L — SIGNIFICANT CHANGE UP (ref 3.5–5.3)
POTASSIUM SERPL-SCNC: 3.6 MMOL/L — SIGNIFICANT CHANGE UP (ref 3.5–5.3)
PROT SERPL-MCNC: 6.6 G/DL — SIGNIFICANT CHANGE UP (ref 6.6–8.7)
PROT UR-MCNC: 100 MG/DL
PROTHROM AB SERPL-ACNC: 10.9 SEC — SIGNIFICANT CHANGE UP (ref 9.9–13.4)
RBC # BLD: 4.29 M/UL — SIGNIFICANT CHANGE UP (ref 3.8–5.2)
RBC # FLD: 12.7 % — SIGNIFICANT CHANGE UP (ref 10.3–14.5)
RBC CASTS # UR COMP ASSIST: 202 /HPF — HIGH (ref 0–4)
SODIUM SERPL-SCNC: 133 MMOL/L — LOW (ref 135–145)
SP GR SPEC: 1.03 — SIGNIFICANT CHANGE UP (ref 1–1.03)
SQUAMOUS # UR AUTO: 23 /HPF — HIGH (ref 0–5)
T VAGINALIS RRNA SPEC QL NAA+PROBE: NEGATIVE
UROBILINOGEN FLD QL: 1 MG/DL — SIGNIFICANT CHANGE UP (ref 0.2–1)
WBC # BLD: 10.5 K/UL — SIGNIFICANT CHANGE UP (ref 3.8–10.5)
WBC # FLD AUTO: 10.5 K/UL — SIGNIFICANT CHANGE UP (ref 3.8–10.5)
WBC UR QL: 276 /HPF — HIGH (ref 0–5)

## 2024-12-23 PROCEDURE — 85610 PROTHROMBIN TIME: CPT

## 2024-12-23 PROCEDURE — 86900 BLOOD TYPING SEROLOGIC ABO: CPT

## 2024-12-23 PROCEDURE — 80053 COMPREHEN METABOLIC PANEL: CPT

## 2024-12-23 PROCEDURE — 84702 CHORIONIC GONADOTROPIN TEST: CPT

## 2024-12-23 PROCEDURE — 85730 THROMBOPLASTIN TIME PARTIAL: CPT

## 2024-12-23 PROCEDURE — 76801 OB US < 14 WKS SINGLE FETUS: CPT | Mod: 26

## 2024-12-23 PROCEDURE — 76801 OB US < 14 WKS SINGLE FETUS: CPT

## 2024-12-23 PROCEDURE — 99284 EMERGENCY DEPT VISIT MOD MDM: CPT | Mod: 25

## 2024-12-23 PROCEDURE — T1013: CPT

## 2024-12-23 PROCEDURE — 87086 URINE CULTURE/COLONY COUNT: CPT

## 2024-12-23 PROCEDURE — 96375 TX/PRO/DX INJ NEW DRUG ADDON: CPT

## 2024-12-23 PROCEDURE — 96374 THER/PROPH/DIAG INJ IV PUSH: CPT

## 2024-12-23 PROCEDURE — 99285 EMERGENCY DEPT VISIT HI MDM: CPT

## 2024-12-23 PROCEDURE — 86901 BLOOD TYPING SEROLOGIC RH(D): CPT

## 2024-12-23 PROCEDURE — 86850 RBC ANTIBODY SCREEN: CPT

## 2024-12-23 PROCEDURE — 36415 COLL VENOUS BLD VENIPUNCTURE: CPT

## 2024-12-23 PROCEDURE — 81001 URINALYSIS AUTO W/SCOPE: CPT

## 2024-12-23 PROCEDURE — 85025 COMPLETE CBC W/AUTO DIFF WBC: CPT

## 2024-12-23 RX ORDER — CEFTRIAXONE SODIUM 1 G
1000 VIAL (EA) INJECTION ONCE
Refills: 0 | Status: DISCONTINUED | OUTPATIENT
Start: 2024-12-23 | End: 2024-12-23

## 2024-12-23 RX ORDER — CEFTRIAXONE SODIUM 1 G
1000 VIAL (EA) INJECTION ONCE
Refills: 0 | Status: COMPLETED | OUTPATIENT
Start: 2024-12-23 | End: 2024-12-23

## 2024-12-23 RX ORDER — ACETAMINOPHEN 500MG 500 MG/1
1000 TABLET, COATED ORAL ONCE
Refills: 0 | Status: COMPLETED | OUTPATIENT
Start: 2024-12-23 | End: 2024-12-23

## 2024-12-23 RX ADMIN — ACETAMINOPHEN 500MG 400 MILLIGRAM(S): 500 TABLET, COATED ORAL at 21:07

## 2024-12-23 RX ADMIN — Medication 1000 MILLIGRAM(S): at 23:31

## 2024-12-23 NOTE — ED PROVIDER NOTE - PHYSICAL EXAMINATION
Const: Awake, alert and oriented. In no acute distress. Well appearing.  HEENT: NC/AT. Moist mucous membranes.  Eyes: No scleral icterus. EOMI.  Neck:. Soft and supple. Full ROM without pain.  Cardiac: Regular rate and regular rhythm. +S1/S2. No murmurs. Peripheral pulses 2+ and symmetric. No LE edema.  Resp: Speaking in full sentences. No evidence of respiratory distress. No wheezes, rales or rhonchi.  Abd: Soft, non-tender, non-distended, uterus palpable above pubic symphysis. Normal bowel sounds in all 4 quadrants. No guarding or rebound.  Back: Spine midline and non-tender. No CVAT.  Skin: No rashes, abrasions or lacerations.  Neuro: Awake, alert & oriented x 3. Moves all extremities symmetrically.

## 2024-12-23 NOTE — ED PROVIDER NOTE - ATTENDING CONTRIBUTION TO CARE
I, Beverly Gill, performed the initial face to face bedside interview with this patient regarding history of present illness, review of symptoms and relevant past medical, social and family history.  I completed an independent physical examination.  I was the initial provider who evaluated this patient. I have signed out the follow up of any pending tests (i.e. labs, radiological studies) to the resident.  I have communicated the patient’s plan of care and disposition with the resident.

## 2024-12-23 NOTE — ED PROVIDER NOTE - OBJECTIVE STATEMENT
at 11 weeks by date (LMP 24) presents complaining of vaginal bleeding that started about 1.5 hours PTA. She states it started mildly, then became almost black, associated with lower abdominal discomfort. She had similar discomfort 4 weeks ago, but not associated with bleeding. She notes no clots with bleeding. She does not believe she is bleeding at this moment. Last sexual intercourse was yesterday. She notes OBGYN is Dr. Avelar.    Review of HIE shows that patient had nuchal study 4 days ago which was normal and is next scheduled for her 20 week scan.

## 2024-12-23 NOTE — ED PROVIDER NOTE - PATIENT PORTAL LINK FT
You can access the FollowMyHealth Patient Portal offered by [facility] by registering at the following website: [email or web address]. By joining [employer]’s FollowMyHealth portal, you will also be able to view your health information using other applications (apps) compatible with our system.

## 2024-12-23 NOTE — ED PROVIDER NOTE - NS ED ROS FT
Const: Denies fever, chills  HEENT: Denies blurry vision, sore throat  Neck: Denies neck pain/stiffness  Resp: Denies coughing, SOB  Cardiovascular: Denies CP, palpitations, LE edema  GI: + Abdominal pain. Denies nausea, vomiting, diarrhea, constipation, blood in stool  : + Vaginal bleeding. Denies urinary frequency/urgency/dysuria, hematuria  MSK: Denies back pain  Neuro: Denies HA, dizziness, numbness, weakness  Skin: Denies rashes.

## 2024-12-23 NOTE — ED PROVIDER NOTE - CLINICAL SUMMARY MEDICAL DECISION MAKING FREE TEXT BOX
19-year-old G1, P0 at 11 weeks presents for vaginal bleeding that started just prior to arrival.  She is not currently bleeding at this time.  Will check labs, formal US, although bedside sono shows normal fetal heart rate and normal fetal movement.  Patient follows with [facility], recently had nuchal, which was normal.

## 2024-12-23 NOTE — ED ADULT TRIAGE NOTE - CHIEF COMPLAINT QUOTE
pt ambulates into triage c/o vaginal bleeding and abdominal pain, pt endorses she is 3 months pregnant, pt soaked through x1 pad in 40minutes, no PMH, pt endorses this is first pregnancy,

## 2024-12-23 NOTE — ED ADULT NURSE NOTE - OBJECTIVE STATEMENT
Pt A&Ox4 c/o abdominal cramping and vaginal bleeding x today approx 3 months pregnant G1. Denies CP, SOB, n/v/d, dizziness, HA. Airway patent. Respirations even and unlabored.

## 2024-12-23 NOTE — ED PROVIDER NOTE - PROGRESS NOTE DETAILS
Beverly Gill, DO: + Fetal movement on bedside US with . Patient feels better. No further bleeding episodes. US normal. Labs unremarkable. UA+ for UTI. Given one dose of rocephin, to go home on keflex. Advised to follow up with gyn for reevaluation  Samer Alpa Calle MD

## 2024-12-25 LAB
CULTURE RESULTS: SIGNIFICANT CHANGE UP
SPECIMEN SOURCE: SIGNIFICANT CHANGE UP

## 2024-12-26 ENCOUNTER — APPOINTMENT (OUTPATIENT)
Dept: OBGYN | Facility: CLINIC | Age: 19
End: 2024-12-26
Payer: MEDICAID

## 2024-12-26 ENCOUNTER — NON-APPOINTMENT (OUTPATIENT)
Age: 19
End: 2024-12-26

## 2024-12-26 ENCOUNTER — ASOB RESULT (OUTPATIENT)
Age: 19
End: 2024-12-26

## 2024-12-26 VITALS
WEIGHT: 131 LBS | DIASTOLIC BLOOD PRESSURE: 70 MMHG | SYSTOLIC BLOOD PRESSURE: 114 MMHG | HEIGHT: 62 IN | BODY MASS INDEX: 24.11 KG/M2

## 2024-12-26 LAB
APPEARANCE: CLEAR
BILIRUBIN URINE: NEGATIVE
BLOOD URINE: NEGATIVE
COLOR: YELLOW
GLUCOSE QUALITATIVE U: NEGATIVE
KETONES URINE: NEGATIVE
LEUKOCYTE ESTERASE URINE: NEGATIVE
NITRITE URINE: NEGATIVE
PH URINE: 7
PROTEIN URINE: NEGATIVE
SPECIFIC GRAVITY URINE: 1.02
UROBILINOGEN URINE: 0.2 (ref 0.2–?)

## 2024-12-26 PROCEDURE — 76815 OB US LIMITED FETUS(S): CPT

## 2024-12-26 PROCEDURE — 0502F SUBSEQUENT PRENATAL CARE: CPT

## 2024-12-26 PROCEDURE — 76817 TRANSVAGINAL US OBSTETRIC: CPT

## 2025-01-16 ENCOUNTER — NON-APPOINTMENT (OUTPATIENT)
Age: 20
End: 2025-01-16

## 2025-01-16 ENCOUNTER — APPOINTMENT (OUTPATIENT)
Dept: OBGYN | Facility: CLINIC | Age: 20
End: 2025-01-16
Payer: MEDICAID

## 2025-01-16 ENCOUNTER — ASOB RESULT (OUTPATIENT)
Age: 20
End: 2025-01-16

## 2025-01-16 ENCOUNTER — APPOINTMENT (OUTPATIENT)
Dept: ANTEPARTUM | Facility: CLINIC | Age: 20
End: 2025-01-16
Payer: MEDICAID

## 2025-01-16 VITALS
WEIGHT: 135 LBS | DIASTOLIC BLOOD PRESSURE: 60 MMHG | SYSTOLIC BLOOD PRESSURE: 100 MMHG | HEIGHT: 62 IN | BODY MASS INDEX: 24.84 KG/M2

## 2025-01-16 DIAGNOSIS — Z34.92 ENCOUNTER FOR SUPERVISION OF NORMAL PREGNANCY, UNSPECIFIED, SECOND TRIMESTER: ICD-10-CM

## 2025-01-16 PROCEDURE — 36415 COLL VENOUS BLD VENIPUNCTURE: CPT

## 2025-01-16 PROCEDURE — 76815 OB US LIMITED FETUS(S): CPT

## 2025-01-16 PROCEDURE — 0502F SUBSEQUENT PRENATAL CARE: CPT

## 2025-01-18 LAB
AFP INTERP SERPL-IMP: NORMAL
AFP INTERP SERPL-IMP: NORMAL
AFP MOM CUT-OFF: 2.5
AFP MOM SERPL: 0.7
AFP PERCENTILE: 13.5
AFP SERPL-ACNC: 24.8 NG/ML
CARBAMAZEPINE?: NO
CURRENT SMOKER: NORMAL
DIABETES STATUS PATIENT: NORMAL
GA: NORMAL
GESTATIONAL AGE METHOD: NORMAL
HX OF NTD NARR: NORMAL
MULTIPLE PREGNANCY: NORMAL
NEURAL TUBE DEFECT RISK FETUS: NORMAL
NEURAL TUBE DEFECT RISK POP: NORMAL
RECOM F/U: NO
TEST PERFORMANCE INFO SPEC: NORMAL
VALPROIC ACID?: NORMAL

## 2025-02-18 ENCOUNTER — APPOINTMENT (OUTPATIENT)
Dept: ANTEPARTUM | Facility: CLINIC | Age: 20
End: 2025-02-18
Payer: MEDICAID

## 2025-02-18 ENCOUNTER — ASOB RESULT (OUTPATIENT)
Age: 20
End: 2025-02-18

## 2025-02-18 PROCEDURE — 76817 TRANSVAGINAL US OBSTETRIC: CPT

## 2025-02-18 PROCEDURE — 76811 OB US DETAILED SNGL FETUS: CPT

## 2025-02-19 ENCOUNTER — APPOINTMENT (OUTPATIENT)
Dept: OBGYN | Facility: CLINIC | Age: 20
End: 2025-02-19
Payer: MEDICAID

## 2025-02-19 VITALS
DIASTOLIC BLOOD PRESSURE: 60 MMHG | HEIGHT: 62 IN | BODY MASS INDEX: 25.58 KG/M2 | SYSTOLIC BLOOD PRESSURE: 110 MMHG | WEIGHT: 139 LBS

## 2025-02-19 DIAGNOSIS — Z34.92 ENCOUNTER FOR SUPERVISION OF NORMAL PREGNANCY, UNSPECIFIED, SECOND TRIMESTER: ICD-10-CM

## 2025-02-19 LAB
APPEARANCE: CLEAR
BILIRUBIN URINE: NEGATIVE
BLOOD URINE: NEGATIVE
COLOR: YELLOW
GLUCOSE QUALITATIVE U: NEGATIVE
KETONES URINE: NEGATIVE
LEUKOCYTE ESTERASE URINE: ABNORMAL
NITRITE URINE: NEGATIVE
PH URINE: 7
PROTEIN URINE: 30
SPECIFIC GRAVITY URINE: 1.02
UROBILINOGEN URINE: 0.2 (ref 0.2–?)

## 2025-02-19 PROCEDURE — 0502F SUBSEQUENT PRENATAL CARE: CPT

## 2025-03-19 ENCOUNTER — APPOINTMENT (OUTPATIENT)
Dept: OBGYN | Facility: CLINIC | Age: 20
End: 2025-03-19
Payer: MEDICAID

## 2025-03-19 DIAGNOSIS — Z34.92 ENCOUNTER FOR SUPERVISION OF NORMAL PREGNANCY, UNSPECIFIED, SECOND TRIMESTER: ICD-10-CM

## 2025-03-19 PROCEDURE — 0502F SUBSEQUENT PRENATAL CARE: CPT

## 2025-03-23 LAB
APPEARANCE: CLEAR
BILIRUBIN URINE: NEGATIVE
BLOOD URINE: NEGATIVE
COLOR: YELLOW
GLUCOSE QUALITATIVE U: NEGATIVE
KETONES URINE: NEGATIVE
LEUKOCYTE ESTERASE URINE: ABNORMAL
NITRITE URINE: NEGATIVE
PH URINE: 6.5
PROTEIN URINE: NEGATIVE
SPECIFIC GRAVITY URINE: 1.02
UROBILINOGEN URINE: 1 (ref 0.2–?)

## 2025-04-02 ENCOUNTER — NON-APPOINTMENT (OUTPATIENT)
Age: 20
End: 2025-04-02

## 2025-04-02 ENCOUNTER — APPOINTMENT (OUTPATIENT)
Dept: OBGYN | Facility: CLINIC | Age: 20
End: 2025-04-02
Payer: MEDICAID

## 2025-04-02 VITALS
BODY MASS INDEX: 27.42 KG/M2 | SYSTOLIC BLOOD PRESSURE: 102 MMHG | WEIGHT: 149 LBS | DIASTOLIC BLOOD PRESSURE: 60 MMHG | HEIGHT: 62 IN

## 2025-04-02 DIAGNOSIS — Z34.92 ENCOUNTER FOR SUPERVISION OF NORMAL PREGNANCY, UNSPECIFIED, SECOND TRIMESTER: ICD-10-CM

## 2025-04-02 LAB
APPEARANCE: CLEAR
BILIRUBIN URINE: NEGATIVE
BLOOD URINE: NEGATIVE
COLOR: YELLOW
GLUCOSE QUALITATIVE U: NEGATIVE
KETONES URINE: NEGATIVE
LEUKOCYTE ESTERASE URINE: ABNORMAL
NITRITE URINE: NEGATIVE
PH URINE: 7
PROTEIN URINE: NEGATIVE
SPECIFIC GRAVITY URINE: 1.02
UROBILINOGEN URINE: 1 (ref 0.2–?)

## 2025-04-02 PROCEDURE — 36415 COLL VENOUS BLD VENIPUNCTURE: CPT

## 2025-04-02 PROCEDURE — 0502F SUBSEQUENT PRENATAL CARE: CPT

## 2025-04-06 LAB
BASOPHILS # BLD AUTO: 0.06 K/UL
BASOPHILS NFR BLD AUTO: 0.5 %
EOSINOPHIL # BLD AUTO: 0.43 K/UL
EOSINOPHIL NFR BLD AUTO: 3.2 %
GLUCOSE 1H P 50 G GLC PO SERPL-MCNC: 122 MG/DL
HCT VFR BLD CALC: 36.1 %
HGB BLD-MCNC: 11.7 G/DL
IMM GRANULOCYTES NFR BLD AUTO: 2.4 %
LYMPHOCYTES # BLD AUTO: 1.6 K/UL
LYMPHOCYTES NFR BLD AUTO: 12.1 %
MAN DIFF?: NORMAL
MCHC RBC-ENTMCNC: 31.4 PG
MCHC RBC-ENTMCNC: 32.4 G/DL
MCV RBC AUTO: 96.8 FL
MONOCYTES # BLD AUTO: 0.96 K/UL
MONOCYTES NFR BLD AUTO: 7.2 %
NEUTROPHILS # BLD AUTO: 9.89 K/UL
NEUTROPHILS NFR BLD AUTO: 74.6 %
PLATELET # BLD AUTO: 293 K/UL
RBC # BLD: 3.73 M/UL
RBC # FLD: 13.4 %
WBC # FLD AUTO: 13.26 K/UL

## 2025-04-21 ENCOUNTER — APPOINTMENT (OUTPATIENT)
Dept: OBGYN | Facility: CLINIC | Age: 20
End: 2025-04-21
Payer: MEDICAID

## 2025-04-21 VITALS
SYSTOLIC BLOOD PRESSURE: 104 MMHG | HEIGHT: 62 IN | DIASTOLIC BLOOD PRESSURE: 62 MMHG | WEIGHT: 152.13 LBS | BODY MASS INDEX: 27.99 KG/M2

## 2025-04-21 DIAGNOSIS — Z34.93 ENCOUNTER FOR SUPERVISION OF NORMAL PREGNANCY, UNSPECIFIED, THIRD TRIMESTER: ICD-10-CM

## 2025-04-21 PROCEDURE — 0502F SUBSEQUENT PRENATAL CARE: CPT

## 2025-05-13 ENCOUNTER — NON-APPOINTMENT (OUTPATIENT)
Age: 20
End: 2025-05-13

## 2025-05-13 ENCOUNTER — APPOINTMENT (OUTPATIENT)
Dept: OBGYN | Facility: CLINIC | Age: 20
End: 2025-05-13
Payer: MEDICAID

## 2025-05-13 ENCOUNTER — APPOINTMENT (OUTPATIENT)
Dept: ANTEPARTUM | Facility: CLINIC | Age: 20
End: 2025-05-13
Payer: MEDICAID

## 2025-05-13 ENCOUNTER — ASOB RESULT (OUTPATIENT)
Age: 20
End: 2025-05-13

## 2025-05-13 VITALS
BODY MASS INDEX: 28.89 KG/M2 | DIASTOLIC BLOOD PRESSURE: 62 MMHG | SYSTOLIC BLOOD PRESSURE: 108 MMHG | WEIGHT: 157 LBS | HEIGHT: 62 IN

## 2025-05-13 DIAGNOSIS — Z23 ENCOUNTER FOR IMMUNIZATION: ICD-10-CM

## 2025-05-13 DIAGNOSIS — Z34.93 ENCOUNTER FOR SUPERVISION OF NORMAL PREGNANCY, UNSPECIFIED, THIRD TRIMESTER: ICD-10-CM

## 2025-05-13 LAB
APPEARANCE: CLEAR
BILIRUBIN URINE: NEGATIVE
BLOOD URINE: NEGATIVE
COLOR: YELLOW
GLUCOSE QUALITATIVE U: NEGATIVE
KETONES URINE: NEGATIVE
LEUKOCYTE ESTERASE URINE: NEGATIVE
NITRITE URINE: NEGATIVE
PH URINE: 6
PROTEIN URINE: 30
SPECIFIC GRAVITY URINE: >=1.03
UROBILINOGEN URINE: 1 (ref 0.2–?)

## 2025-05-13 PROCEDURE — 76816 OB US FOLLOW-UP PER FETUS: CPT

## 2025-05-13 PROCEDURE — 0502F SUBSEQUENT PRENATAL CARE: CPT

## 2025-05-13 PROCEDURE — 90715 TDAP VACCINE 7 YRS/> IM: CPT

## 2025-05-13 PROCEDURE — 36415 COLL VENOUS BLD VENIPUNCTURE: CPT

## 2025-05-13 PROCEDURE — 90471 IMMUNIZATION ADMIN: CPT

## 2025-05-14 LAB
HCV AB SER QL: NONREACTIVE
HCV S/CO RATIO: 0.17 S/CO
HIV1+2 AB SPEC QL IA.RAPID: NONREACTIVE
T PALLIDUM AB SER QL IA: NEGATIVE

## 2025-05-16 LAB
C TRACH RRNA SPEC QL NAA+PROBE: NOT DETECTED
N GONORRHOEA RRNA SPEC QL NAA+PROBE: NOT DETECTED
SOURCE AMPLIFICATION: NORMAL

## 2025-05-28 ENCOUNTER — NON-APPOINTMENT (OUTPATIENT)
Age: 20
End: 2025-05-28

## 2025-05-28 ENCOUNTER — APPOINTMENT (OUTPATIENT)
Dept: OBGYN | Facility: CLINIC | Age: 20
End: 2025-05-28
Payer: MEDICAID

## 2025-05-28 VITALS
SYSTOLIC BLOOD PRESSURE: 102 MMHG | HEIGHT: 62 IN | WEIGHT: 166 LBS | BODY MASS INDEX: 30.55 KG/M2 | DIASTOLIC BLOOD PRESSURE: 62 MMHG

## 2025-05-28 DIAGNOSIS — Z34.93 ENCOUNTER FOR SUPERVISION OF NORMAL PREGNANCY, UNSPECIFIED, THIRD TRIMESTER: ICD-10-CM

## 2025-05-28 PROCEDURE — 0502F SUBSEQUENT PRENATAL CARE: CPT

## 2025-05-29 LAB
APPEARANCE: CLEAR
BILIRUBIN URINE: ABNORMAL
BLOOD URINE: NEGATIVE
COLOR: YELLOW
GLUCOSE QUALITATIVE U: NEGATIVE
KETONES URINE: NEGATIVE
LEUKOCYTE ESTERASE URINE: NEGATIVE
NITRITE URINE: NEGATIVE
PH URINE: 6
PROTEIN URINE: 30
SPECIFIC GRAVITY URINE: >=1.03
UROBILINOGEN URINE: 0.2 (ref 0.2–?)

## 2025-06-06 ENCOUNTER — NON-APPOINTMENT (OUTPATIENT)
Age: 20
End: 2025-06-06

## 2025-06-11 ENCOUNTER — APPOINTMENT (OUTPATIENT)
Dept: OBGYN | Facility: CLINIC | Age: 20
End: 2025-06-11
Payer: MEDICAID

## 2025-06-11 VITALS
BODY MASS INDEX: 31.65 KG/M2 | HEIGHT: 62 IN | WEIGHT: 172 LBS | DIASTOLIC BLOOD PRESSURE: 62 MMHG | SYSTOLIC BLOOD PRESSURE: 112 MMHG

## 2025-06-11 LAB
APPEARANCE: CLEAR
BILIRUBIN URINE: NEGATIVE
BLOOD URINE: ABNORMAL
COLOR: YELLOW
GLUCOSE QUALITATIVE U: NEGATIVE
KETONES URINE: NEGATIVE
LEUKOCYTE ESTERASE URINE: ABNORMAL
NITRITE URINE: NEGATIVE
PH URINE: 6
PROTEIN URINE: 100
SPECIFIC GRAVITY URINE: >=1.03
UROBILINOGEN URINE: 0.2 (ref 0.2–?)

## 2025-06-11 PROCEDURE — 0502F SUBSEQUENT PRENATAL CARE: CPT

## 2025-06-18 ENCOUNTER — APPOINTMENT (OUTPATIENT)
Dept: OBGYN | Facility: CLINIC | Age: 20
End: 2025-06-18
Payer: MEDICAID

## 2025-06-18 VITALS
BODY MASS INDEX: 32.11 KG/M2 | WEIGHT: 174.5 LBS | SYSTOLIC BLOOD PRESSURE: 120 MMHG | DIASTOLIC BLOOD PRESSURE: 82 MMHG | HEIGHT: 62 IN

## 2025-06-18 LAB
APPEARANCE: CLEAR
B-HEM STREP SPEC QL CULT: NORMAL
BILIRUBIN URINE: ABNORMAL
BLOOD URINE: NEGATIVE
COLOR: YELLOW
GLUCOSE QUALITATIVE U: NEGATIVE
KETONES URINE: NEGATIVE
LEUKOCYTE ESTERASE URINE: ABNORMAL
NITRITE URINE: NEGATIVE
PH URINE: 6.5
PROTEIN URINE: 100
SPECIFIC GRAVITY URINE: >=1.03
UROBILINOGEN URINE: 1 (ref 0.2–?)

## 2025-06-18 PROCEDURE — 0502F SUBSEQUENT PRENATAL CARE: CPT

## 2025-06-25 ENCOUNTER — APPOINTMENT (OUTPATIENT)
Dept: OBGYN | Facility: CLINIC | Age: 20
End: 2025-06-25
Payer: MEDICAID

## 2025-06-25 VITALS
HEIGHT: 62 IN | DIASTOLIC BLOOD PRESSURE: 70 MMHG | WEIGHT: 174 LBS | BODY MASS INDEX: 32.02 KG/M2 | SYSTOLIC BLOOD PRESSURE: 107 MMHG

## 2025-06-25 LAB
APPEARANCE: CLEAR
BILIRUBIN URINE: ABNORMAL
BLOOD URINE: ABNORMAL
COLOR: YELLOW
GLUCOSE QUALITATIVE U: NEGATIVE
KETONES URINE: ABNORMAL
LEUKOCYTE ESTERASE URINE: ABNORMAL
NITRITE URINE: NEGATIVE
PH URINE: 6
PROTEIN URINE: >=300
SPECIFIC GRAVITY URINE: >=1.03
UROBILINOGEN URINE: 1 (ref 0.2–?)

## 2025-06-25 PROCEDURE — 0502F SUBSEQUENT PRENATAL CARE: CPT

## 2025-06-26 ENCOUNTER — INPATIENT (INPATIENT)
Facility: HOSPITAL | Age: 20
LOS: 2 days | Discharge: HOME | DRG: 833 | End: 2025-06-29
Attending: STUDENT IN AN ORGANIZED HEALTH CARE EDUCATION/TRAINING PROGRAM | Admitting: STUDENT IN AN ORGANIZED HEALTH CARE EDUCATION/TRAINING PROGRAM
Payer: COMMERCIAL

## 2025-06-26 VITALS — HEART RATE: 83 BPM

## 2025-06-26 DIAGNOSIS — O26.899 OTHER SPECIFIED PREGNANCY RELATED CONDITIONS, UNSPECIFIED TRIMESTER: ICD-10-CM

## 2025-06-26 DIAGNOSIS — Z98.890 OTHER SPECIFIED POSTPROCEDURAL STATES: Chronic | ICD-10-CM

## 2025-06-26 LAB
ALBUMIN SERPL ELPH-MCNC: 3.1 G/DL — LOW (ref 3.3–5.2)
ALP SERPL-CCNC: 178 U/L — HIGH (ref 40–120)
ALT FLD-CCNC: 6 U/L — SIGNIFICANT CHANGE UP
ANION GAP SERPL CALC-SCNC: 13 MMOL/L — SIGNIFICANT CHANGE UP (ref 5–17)
APPEARANCE UR: ABNORMAL
APTT BLD: 25.3 SEC — LOW (ref 26.1–36.8)
AST SERPL-CCNC: 11 U/L — SIGNIFICANT CHANGE UP
BACTERIA # UR AUTO: ABNORMAL /HPF
BASOPHILS # BLD AUTO: 0.03 K/UL — SIGNIFICANT CHANGE UP (ref 0–0.2)
BASOPHILS NFR BLD AUTO: 0.4 % — SIGNIFICANT CHANGE UP (ref 0–2)
BILIRUB SERPL-MCNC: <0.2 MG/DL — LOW (ref 0.4–2)
BILIRUB UR-MCNC: NEGATIVE — SIGNIFICANT CHANGE UP
BUN SERPL-MCNC: 7.9 MG/DL — LOW (ref 8–20)
CALCIUM SERPL-MCNC: 8.1 MG/DL — LOW (ref 8.4–10.5)
CAST: 1 /LPF — SIGNIFICANT CHANGE UP (ref 0–4)
CHLORIDE SERPL-SCNC: 104 MMOL/L — SIGNIFICANT CHANGE UP (ref 96–108)
CO2 SERPL-SCNC: 17 MMOL/L — LOW (ref 22–29)
COLOR SPEC: SIGNIFICANT CHANGE UP
CREAT ?TM UR-MCNC: 236 MG/DL — SIGNIFICANT CHANGE UP
CREAT SERPL-MCNC: 0.46 MG/DL — LOW (ref 0.5–1.3)
DIFF PNL FLD: ABNORMAL
EGFR: 140 ML/MIN/1.73M2 — SIGNIFICANT CHANGE UP
EGFR: 140 ML/MIN/1.73M2 — SIGNIFICANT CHANGE UP
EOSINOPHIL # BLD AUTO: 0.34 K/UL — SIGNIFICANT CHANGE UP (ref 0–0.5)
EOSINOPHIL NFR BLD AUTO: 4.6 % — SIGNIFICANT CHANGE UP (ref 0–6)
FIBRINOGEN PPP-MCNC: 412 MG/DL — SIGNIFICANT CHANGE UP (ref 200–450)
GLUCOSE SERPL-MCNC: 87 MG/DL — SIGNIFICANT CHANGE UP (ref 70–99)
GLUCOSE UR QL: NEGATIVE MG/DL — SIGNIFICANT CHANGE UP
HCT VFR BLD CALC: 31.4 % — LOW (ref 34.5–45)
HGB BLD-MCNC: 10.4 G/DL — LOW (ref 11.5–15.5)
IMM GRANULOCYTES # BLD AUTO: 0.11 K/UL — HIGH (ref 0–0.07)
IMM GRANULOCYTES NFR BLD AUTO: 1.5 % — HIGH (ref 0–0.9)
INR BLD: 0.86 RATIO — SIGNIFICANT CHANGE UP (ref 0.85–1.16)
KETONES UR QL: ABNORMAL MG/DL
LDH SERPL L TO P-CCNC: 171 U/L — SIGNIFICANT CHANGE UP (ref 98–192)
LEUKOCYTE ESTERASE UR-ACNC: ABNORMAL
LYMPHOCYTES # BLD AUTO: 1.65 K/UL — SIGNIFICANT CHANGE UP (ref 1–3.3)
LYMPHOCYTES NFR BLD AUTO: 22.2 % — SIGNIFICANT CHANGE UP (ref 13–44)
MCHC RBC-ENTMCNC: 28.1 PG — SIGNIFICANT CHANGE UP (ref 27–34)
MCHC RBC-ENTMCNC: 33.1 G/DL — SIGNIFICANT CHANGE UP (ref 32–36)
MCV RBC AUTO: 84.9 FL — SIGNIFICANT CHANGE UP (ref 80–100)
MONOCYTES # BLD AUTO: 0.77 K/UL — SIGNIFICANT CHANGE UP (ref 0–0.9)
MONOCYTES NFR BLD AUTO: 10.4 % — SIGNIFICANT CHANGE UP (ref 2–14)
NEUTROPHILS # BLD AUTO: 4.53 K/UL — SIGNIFICANT CHANGE UP (ref 1.8–7.4)
NEUTROPHILS NFR BLD AUTO: 60.9 % — SIGNIFICANT CHANGE UP (ref 43–77)
NITRITE UR-MCNC: NEGATIVE — SIGNIFICANT CHANGE UP
NRBC # BLD AUTO: 0 K/UL — SIGNIFICANT CHANGE UP (ref 0–0)
NRBC # FLD: 0 K/UL — SIGNIFICANT CHANGE UP (ref 0–0)
NRBC BLD AUTO-RTO: 0 /100 WBCS — SIGNIFICANT CHANGE UP (ref 0–0)
PH UR: 6 — SIGNIFICANT CHANGE UP (ref 5–8)
PLATELET # BLD AUTO: 216 K/UL — SIGNIFICANT CHANGE UP (ref 150–400)
PMV BLD: 11.8 FL — SIGNIFICANT CHANGE UP (ref 7–13)
POTASSIUM SERPL-MCNC: 3.9 MMOL/L — SIGNIFICANT CHANGE UP (ref 3.5–5.3)
POTASSIUM SERPL-SCNC: 3.9 MMOL/L — SIGNIFICANT CHANGE UP (ref 3.5–5.3)
PROT ?TM UR-MCNC: 223 MG/DL — HIGH (ref 0–12)
PROT SERPL-MCNC: 5.7 G/DL — LOW (ref 6.6–8.7)
PROT UR-MCNC: 300 MG/DL
PROT/CREAT UR-RTO: 0.9 RATIO — HIGH
PROTHROM AB SERPL-ACNC: 9.7 SEC — LOW (ref 9.9–13.4)
RBC # BLD: 3.7 M/UL — LOW (ref 3.8–5.2)
RBC # FLD: 13.4 % — SIGNIFICANT CHANGE UP (ref 10.3–14.5)
RBC CASTS # UR COMP ASSIST: 4 /HPF — SIGNIFICANT CHANGE UP (ref 0–4)
SODIUM SERPL-SCNC: 134 MMOL/L — LOW (ref 135–145)
SP GR SPEC: >1.03 — HIGH (ref 1–1.03)
SQUAMOUS # UR AUTO: >36 /HPF — HIGH (ref 0–5)
URATE SERPL-MCNC: 4.1 MG/DL — SIGNIFICANT CHANGE UP (ref 2.4–5.7)
UROBILINOGEN FLD QL: 1 MG/DL — SIGNIFICANT CHANGE UP (ref 0.2–1)
WBC # BLD: 7.43 K/UL — SIGNIFICANT CHANGE UP (ref 3.8–10.5)
WBC # FLD AUTO: 7.43 K/UL — SIGNIFICANT CHANGE UP (ref 3.8–10.5)
WBC UR QL: 277 /HPF — HIGH (ref 0–5)

## 2025-06-26 NOTE — OB RN TRIAGE NOTE - FALL HARM RISK - UNIVERSAL INTERVENTIONS
Bed in lowest position, wheels locked, appropriate side rails in place/Call bell, personal items and telephone in reach/Instruct patient to call for assistance before getting out of bed or chair/Non-slip footwear when patient is out of bed/[place] to call system/Physically safe environment - no spills, clutter or unnecessary equipment/Purposeful Proactive Rounding/Room/bathroom lighting operational, light cord in reach

## 2025-06-26 NOTE — OB PROVIDER TRIAGE NOTE - HISTORY OF PRESENT ILLNESS
20y  at 38+w 6d GA who presents to L&D for vaginal spotting and leakage of fluid that began 1 day ago. Patient denies contractions but endorses 1 day of abdomen tightness that comes and goes. She endorses good fetal movement. Denies fevers, chills, chest pain, SOB, dizziness and headache. Pt reports she is nauseous a lot and vomited once this morning. No other complaints at this time.     RUSLAN: 2025    Prenatal course uncomplicated.      Obhx: none  Gynhx: -fibroids, +ovarian cysts, denies STD hx, denies abnormal PAPs   PMH: Denies  PSH: L breast fibroma removed in 2019  Soc hx: Denies EtOH, tobacco and illicit drug use during this pregnancy  Meds: PNV  Allergies: NKDA       20y  at 38+w 6d GA who presents to L&D for vaginal spotting and leakage of pink mucousy fluid that began 1 day ago. Patient denies contractions but endorses 1 day of abdomen tightness that comes and goes. She endorses good fetal movement. Denies fevers, chills, chest pain, SOB, dizziness and headache. Pt reports she is nauseous a lot and vomited once this morning. Pt denies any vaginal itchiness, no pain with urination.  No other complaints at this time.     RUSLAN: 2025    Prenatal course uncomplicated.      Obhx: none  Gynhx: -fibroids, +ovarian cysts, denies STD hx, denies abnormal PAPs   PMH: Denies  PSH: L breast fibroma removed in 2019  Soc hx: Denies EtOH, tobacco and illicit drug use during this pregnancy  Meds: PNV  Allergies: NKDA

## 2025-06-26 NOTE — OB PROVIDER TRIAGE NOTE - NSHPPHYSICALEXAM_GEN_ALL_CORE
T(C): 36.9 (06-26-25 @ 21:18), Max: 36.9 (06-26-25 @ 21:18)  HR: 83 (06-26-25 @ 21:18) (83 - 83)  BP: 130/89 (06-26-25 @ 21:18) (130/89 - 130/89)  RR: 18 (06-26-25 @ 21:18) (18 - 18)  SpO2: --    Gen: NAD, well-appearing, AAOx3

## 2025-06-26 NOTE — OB PROVIDER TRIAGE NOTE - NS_FETALMOVEMENT_OBGYN_ALL_OB
Present, unchanged Eye Protection Verbiage: Before proceeding with the stage, a plastic scleral shield was inserted. The globe was anesthetized with a few drops of 1% lidocaine with 1:100,000 epinephrine. Then, an appropriate sized scleral shield was chosen and coated with lacrilube ointment. The shield was gently inserted and left in place for the duration of each stage. After the stage was completed, the shield was gently removed.

## 2025-06-27 DIAGNOSIS — O26.893 OTHER SPECIFIED PREGNANCY RELATED CONDITIONS, THIRD TRIMESTER: ICD-10-CM

## 2025-06-27 LAB
BLD GP AB SCN SERPL QL: SIGNIFICANT CHANGE UP
CANDIDA AB TITR SER: SIGNIFICANT CHANGE UP
G VAGINALIS DNA SPEC QL NAA+PROBE: DETECTED
T PALLIDUM AB TITR SER: NEGATIVE — SIGNIFICANT CHANGE UP
T VAGINALIS SPEC QL WET PREP: SIGNIFICANT CHANGE UP

## 2025-06-27 PROCEDURE — 83615 LACTATE (LD) (LDH) ENZYME: CPT

## 2025-06-27 PROCEDURE — 86850 RBC ANTIBODY SCREEN: CPT

## 2025-06-27 PROCEDURE — 87800 DETECT AGNT MULT DNA DIREC: CPT

## 2025-06-27 PROCEDURE — 86780 TREPONEMA PALLIDUM: CPT

## 2025-06-27 PROCEDURE — 36415 COLL VENOUS BLD VENIPUNCTURE: CPT

## 2025-06-27 PROCEDURE — 82570 ASSAY OF URINE CREATININE: CPT

## 2025-06-27 PROCEDURE — 84156 ASSAY OF PROTEIN URINE: CPT

## 2025-06-27 PROCEDURE — 81001 URINALYSIS AUTO W/SCOPE: CPT

## 2025-06-27 PROCEDURE — 86900 BLOOD TYPING SEROLOGIC ABO: CPT

## 2025-06-27 PROCEDURE — 86901 BLOOD TYPING SEROLOGIC RH(D): CPT

## 2025-06-27 PROCEDURE — 84550 ASSAY OF BLOOD/URIC ACID: CPT

## 2025-06-27 PROCEDURE — 85730 THROMBOPLASTIN TIME PARTIAL: CPT

## 2025-06-27 PROCEDURE — 80053 COMPREHEN METABOLIC PANEL: CPT

## 2025-06-27 PROCEDURE — 85025 COMPLETE CBC W/AUTO DIFF WBC: CPT

## 2025-06-27 PROCEDURE — 85384 FIBRINOGEN ACTIVITY: CPT

## 2025-06-27 PROCEDURE — 85610 PROTHROMBIN TIME: CPT

## 2025-06-27 RX ORDER — OXYTOCIN-SODIUM CHLORIDE 0.9% IV SOLN 30 UNIT/500ML 30-0.9/5 UT/ML-%
SOLUTION INTRAVENOUS
Qty: 30 | Refills: 0 | Status: DISCONTINUED | OUTPATIENT
Start: 2025-06-27 | End: 2025-06-28

## 2025-06-27 RX ORDER — SODIUM CHLORIDE 9 G/1000ML
1000 INJECTION, SOLUTION INTRAVENOUS
Refills: 0 | Status: DISCONTINUED | OUTPATIENT
Start: 2025-06-27 | End: 2025-06-28

## 2025-06-27 RX ORDER — CITRIC ACID/SODIUM CITRATE 300-500 MG
30 SOLUTION, ORAL ORAL ONCE
Refills: 0 | Status: DISCONTINUED | OUTPATIENT
Start: 2025-06-27 | End: 2025-06-28

## 2025-06-27 RX ORDER — SODIUM CHLORIDE 9 G/1000ML
500 INJECTION, SOLUTION INTRAVENOUS ONCE
Refills: 0 | Status: COMPLETED | OUTPATIENT
Start: 2025-06-27 | End: 2025-06-27

## 2025-06-27 RX ORDER — SODIUM CHLORIDE 9 G/1000ML
1000 INJECTION, SOLUTION INTRAVENOUS
Refills: 0 | Status: DISCONTINUED | OUTPATIENT
Start: 2025-06-27 | End: 2025-06-29

## 2025-06-27 RX ORDER — OXYTOCIN-SODIUM CHLORIDE 0.9% IV SOLN 30 UNIT/500ML 30-0.9/5 UT/ML-%
167 SOLUTION INTRAVENOUS
Qty: 30 | Refills: 0 | Status: DISCONTINUED | OUTPATIENT
Start: 2025-06-27 | End: 2025-06-29

## 2025-06-27 RX ADMIN — SODIUM CHLORIDE 125 MILLILITER(S): 9 INJECTION, SOLUTION INTRAVENOUS at 10:46

## 2025-06-27 RX ADMIN — SODIUM CHLORIDE 125 MILLILITER(S): 9 INJECTION, SOLUTION INTRAVENOUS at 14:30

## 2025-06-27 RX ADMIN — SODIUM CHLORIDE 250 MILLILITER(S): 9 INJECTION, SOLUTION INTRAVENOUS at 18:05

## 2025-06-27 RX ADMIN — OXYTOCIN-SODIUM CHLORIDE 0.9% IV SOLN 30 UNIT/500ML 2 MILLIUNIT(S)/MIN: 30-0.9/5 SOLUTION at 03:55

## 2025-06-27 RX ADMIN — SODIUM CHLORIDE 125 MILLILITER(S): 9 INJECTION, SOLUTION INTRAVENOUS at 03:54

## 2025-06-27 RX ADMIN — SODIUM CHLORIDE 1000 MILLILITER(S): 9 INJECTION, SOLUTION INTRAVENOUS at 17:55

## 2025-06-27 NOTE — OB RN PATIENT PROFILE - POST PARTUM DEPRESSION SCREEN OB 3
[FreeTextEntry1] : 88-year-old male, right-hand-dominant, now with ESRD on dialysis via right chest wall PermCath, in need of permanent dialysis access.\par I discussed with the patient and his son creation of AV fistula versus AV graft, timeframe for maturation of AV fistula, and potential need for balloon assisted maturation.  We will obtain vein mapping in expeditious fashion, and will decide on mode of dialysis access surgery.  He will return for follow-up after testing performed. no

## 2025-06-27 NOTE — OB PROVIDER H&P - ASSESSMENT
A/P: 21yo  at 39wks admitted for early labor    -Admit to L&D  -1 moderately elevated BP on arrival, CBC and CMP unremarkable, P/C 0.9  -Consent  -Admission labs  -IV fluids  -Fetus: Cat I tracing. Continuous toco and fetal monitoring.   -GBS: Negative, no GBS ppx required   -Analgesia: epi PRN    Discussed with Dr. Avelar

## 2025-06-27 NOTE — OB RN PATIENT PROFILE - FALL HARM RISK - UNIVERSAL INTERVENTIONS
Bed in lowest position, wheels locked, appropriate side rails in place/Call bell, personal items and telephone in reach/Instruct patient to call for assistance before getting out of bed or chair/Non-slip footwear when patient is out of bed/[place] to call system/Physically safe environment - no spills, clutter or unnecessary equipment/Purposeful Proactive Rounding/Room/bathroom lighting operational, light cord in reach

## 2025-06-27 NOTE — OB RN PATIENT PROFILE - BREASTFEEDING PROVIDES STABLE TEMPERATURE THROUGH SKIN TO SKIN CONTACT
Spoke to pt's dad and confirmed times and dates for imaging with anesthesia and VV f/u with Dr. Diamond. I also send message through portal giving fasting instructions for anesthesia event   Statement Selected

## 2025-06-27 NOTE — OB PROVIDER H&P - NS_ADMITREASON_OBGYN_ALL_OB
Problem: Self Care Deficits Care Plan (Adult)  Goal: *Acute Goals and Plan of Care (Insert Text)  Description: Occupational Therapy Goals  Initiated 12/14/2021 within 7 day(s). 1.  Patient will perform grooming with supervision/set-up. 2.  Patient will perform bathing with supervision/set-up. 3.  Patient will perform upper body dressing and lower body dressing with supervision/set-up. 4.  Patient will perform toilet transfers with supervision/set-up using RW. 5.  Patient will perform all aspects of toileting with supervision/set-up. 6.  Patient will participate in upper extremity therapeutic exercise/activities with supervision/set-up for 8 minutes. 7.  Patient will utilize energy conservation techniques during functional activities with min verbal cues. Prior Level of Function: pt reports she was Supv-set up with ADLs and functional mobility using RW up until this past Sunday when all over body tremors began to worsen resulting in increased assist required by staff at group home e.g. ADLs     Outcome: Progressing Towards Goal   OCCUPATIONAL THERAPY TREATMENT    Patient: Janet Patterson (36 y.o. female)  Date: 12/16/2021  Diagnosis: Chest pain [R07.9] Chest pain       Precautions: Fall  PLOF: pt reports she was Supv-set up with ADLs and functional mobility using RW up until this past Sunday when all over body tremors began to worsen resulting in increased assist required by staff at group home e.g. ADLs    Chart, occupational therapy assessment, plan of care, and goals were reviewed. ASSESSMENT:  Pt cleared by RN for OT tx at this time. Pt presented semi reclined in bed upon therapist arrival reporting having nausea however agreeable to work with OT. Pt with notable increased B UE tremors limiting (I) with self care.  Pt educated on mult energy conservation techniques including pacing and deep breathing to prevent SOB and fatigue, and increase activity tolerance and safety w/ADLs and functional mobility, pt verbalized understanding. She required CGA rolling L/R to readjust chux pad and MAX A for isa area hygiene. Pt was assisted scooting towards HOB with MIN A for optimal bed positioning. She required MOD A for facial hygiene and CGA for oral care 2/2 B UE tremors @ bed level. Pt was positioned for comfort and left with HOB Elevated, bed alarm active, and all needs left within reach. RN made aware of pt's participation and position. Progression toward goals:  []          Improving appropriately and progressing toward goals  [x]          Improving slowly and progressing toward goals  []          Not making progress toward goals and plan of care will be adjusted     PLAN:  Patient continues to benefit from skilled intervention to address the above impairments. Continue treatment per established plan of care. Discharge Recommendations: SNF  Further Equipment Recommendations for Discharge:  RW and BSC     SUBJECTIVE:   Patient stated  I am nauseous. \"     OBJECTIVE DATA SUMMARY:   Cognitive/Behavioral Status:  Neurologic State: Alert  Orientation Level: Oriented X4  Cognition: Follows commands  Safety/Judgement: Fall prevention    Functional Mobility and Transfers for ADLs:   Bed Mobility:  Rolling: Contact guard assistance        Scooting: Minimum assistance (towards HOB )     ADL Intervention:       Grooming  Washing Face: Moderate assistance (2/2 B UE tremors)  Brushing Teeth: Contact guard assistance (2/2 B UE tremors)         Lower Body Bathing  Perineal  : Maximum assistance (2/2 B UE tremors)  Position Performed: Supine    Pain:  Pain level pre-treatment: 0/10   Pain level post-treatment: 0/10    Activity Tolerance:    Poor   Please refer to the flowsheet for vital signs taken during this treatment.   After treatment:   []  Patient left in no apparent distress sitting up in chair  [x]  Patient left in no apparent distress in bed  [x]  Call bell left within reach  [x]  Nursing notified  []  Caregiver present  [x]  Bed alarm activated    COMMUNICATION/EDUCATION:   [x] Role of Occupational Therapy in the acute care setting  [] Home safety education was provided and the patient/caregiver indicated understanding. [x] Patient/family have participated as able in working towards goals and plan of care. [x] Patient/family agree to work toward stated goals and plan of care. [] Patient understands intent and goals of therapy, but is neutral about his/her participation. [] Patient is unable to participate in goal setting and plan of care.       Thank you for this referral.  REY Joseph  Time Calculation: 26 mins Labor at Term

## 2025-06-27 NOTE — OB PROVIDER H&P - HISTORY OF PRESENT ILLNESS
20y  at 39wGA initially presented to triage with complaint of blood-tinged mucus that began a day prior. She denies vaginal bleeding, leakage of watery fluid, or painful contractions and endorses good fetal movement. She has no other complaints at this time.    Pregnancy course uncomplicated.    LMP: 24  RUSLAN: 25    Obhx: none  Gynhx: -fibroids, +ovarian cysts, denies STD hx, denies abnormal PAPs   PMH: Denies  PSH: L breast fibroma removed in 2019  Soc hx: Denies EtOH, tobacco and illicit drug use during this pregnancy  Meds: PNV  Allergies: NKDA    Last sono : vertex, anterior placenta, 2077gm, 51%

## 2025-06-27 NOTE — OB PROVIDER LABOR PROGRESS NOTE - ASSESSMENT
LR bolus and D5LR bolus -> Improvement noted with scalp stimulation and fluid resuscitation, tracing now Cat I
- on Pitocin 8mu/min   - will continue to monitor FHT/[place] and reassess for cervical change when clinically indicated

## 2025-06-27 NOTE — OB PROVIDER LABOR PROGRESS NOTE - NS_SUBJECTIVE/OBJECTIVE_OBGYN_ALL_OB_FT
21 y/o  at 39w0d admitted for gHTN.   Elevated BP x1 and PCR of 0.9.
Pt seen and examined at bedside. Reporting intermittent pressure.   Minimal variability noted.

## 2025-06-27 NOTE — OB PROVIDER H&P - NSHPPHYSICALEXAM_GEN_ALL_CORE
Vital Signs Last 24 Hrs  T(C): 36.9 (27 Jun 2025 00:57), Max: 36.9 (26 Jun 2025 21:10)  T(F): 98.4 (27 Jun 2025 00:57), Max: 98.42 (26 Jun 2025 21:10)  HR: 76 (27 Jun 2025 01:02) (61 - 83)  BP: 128/81 (27 Jun 2025 01:02) (123/84 - 148/89)  RR: 18 (27 Jun 2025 00:57) (18 - 18)    Parameters below as of 27 Jun 2025 00:57  Patient On (Oxygen Delivery Method): room air    Gen: NAD, well-appearing, AAOx3   Resp: breathing comfortably on RA  Abd: Soft, gravid, nontender  SSE: no pooling of fluid, nitrazine negative  SVE:  3.5/60/-2  Bedside sono: vertex, anterior placenta    FHT: baseline 145bpm, moderate variability, +accels, -decels  [place]: q5 min

## 2025-06-27 NOTE — OB PROVIDER H&P - NSHPLABSRESULTS_GEN_ALL_CORE
10.4   7.43  )-----------( 216      ( 26 Jun 2025 22:40 )             31.4     06-26    134[L]  |  104  |  7.9[L]  ----------------------------<  87  3.9   |  17.0[L]  |  0.46[L]    Ca    8.1[L]      26 Jun 2025 22:40    TPro  5.7[L]  /  Alb  3.1[L]  /  TBili  <0.2[L]  /  DBili  x   /  AST  11  /  ALT  6   /  AlkPhos  178[H]  06-26    Urinalysis Basic - ( 26 Jun 2025 22:40 )    Color: Dark Yellow / Appearance: Cloudy / SG: >1.030 / pH: x  Gluc: 87 mg/dL / Ketone: x  / Bili: Negative / Urobili: 1.0 mg/dL   Blood: x / Protein: 300 mg/dL / Nitrite: Negative   Leuk Esterase: Small / RBC: 4 /HPF /  /HPF   Sq Epi: x / Non Sq Epi: >36 /HPF / Bacteria: Many /HPF    Total Protein, Random Urine: 223.0 mg/dL  Protein/Creatinine Ratio Calculation: 0.9 Ratio  Creatinine, Random Urine: 236    ABO RH Interpretation: O POS

## 2025-06-28 ENCOUNTER — TRANSCRIPTION ENCOUNTER (OUTPATIENT)
Age: 20
End: 2025-06-28

## 2025-06-28 LAB
ALBUMIN SERPL ELPH-MCNC: 2.9 G/DL — LOW (ref 3.3–5.2)
ALP SERPL-CCNC: 164 U/L — HIGH (ref 40–120)
ALT FLD-CCNC: 6 U/L — SIGNIFICANT CHANGE UP
ANION GAP SERPL CALC-SCNC: 12 MMOL/L — SIGNIFICANT CHANGE UP (ref 5–17)
AST SERPL-CCNC: 23 U/L — SIGNIFICANT CHANGE UP
BILIRUB SERPL-MCNC: 0.5 MG/DL — SIGNIFICANT CHANGE UP (ref 0.4–2)
BUN SERPL-MCNC: 7.5 MG/DL — LOW (ref 8–20)
CALCIUM SERPL-MCNC: 8.6 MG/DL — SIGNIFICANT CHANGE UP (ref 8.4–10.5)
CHLORIDE SERPL-SCNC: 104 MMOL/L — SIGNIFICANT CHANGE UP (ref 96–108)
CO2 SERPL-SCNC: 20 MMOL/L — LOW (ref 22–29)
CREAT SERPL-MCNC: 0.51 MG/DL — SIGNIFICANT CHANGE UP (ref 0.5–1.3)
EGFR: 136 ML/MIN/1.73M2 — SIGNIFICANT CHANGE UP
EGFR: 136 ML/MIN/1.73M2 — SIGNIFICANT CHANGE UP
GLUCOSE SERPL-MCNC: 81 MG/DL — SIGNIFICANT CHANGE UP (ref 70–99)
HCT VFR BLD CALC: 32.3 % — LOW (ref 34.5–45)
HGB BLD-MCNC: 10.6 G/DL — LOW (ref 11.5–15.5)
MCHC RBC-ENTMCNC: 28 PG — SIGNIFICANT CHANGE UP (ref 27–34)
MCHC RBC-ENTMCNC: 32.8 G/DL — SIGNIFICANT CHANGE UP (ref 32–36)
MCV RBC AUTO: 85.2 FL — SIGNIFICANT CHANGE UP (ref 80–100)
NRBC # BLD AUTO: 0 K/UL — SIGNIFICANT CHANGE UP (ref 0–0)
NRBC # FLD: 0 K/UL — SIGNIFICANT CHANGE UP (ref 0–0)
NRBC BLD AUTO-RTO: 0 /100 WBCS — SIGNIFICANT CHANGE UP (ref 0–0)
PLATELET # BLD AUTO: 222 K/UL — SIGNIFICANT CHANGE UP (ref 150–400)
PMV BLD: 12.3 FL — SIGNIFICANT CHANGE UP (ref 7–13)
POTASSIUM SERPL-MCNC: 3.8 MMOL/L — SIGNIFICANT CHANGE UP (ref 3.5–5.3)
POTASSIUM SERPL-SCNC: 3.8 MMOL/L — SIGNIFICANT CHANGE UP (ref 3.5–5.3)
PROT SERPL-MCNC: 5.3 G/DL — LOW (ref 6.6–8.7)
RBC # BLD: 3.79 M/UL — LOW (ref 3.8–5.2)
RBC # FLD: 13.5 % — SIGNIFICANT CHANGE UP (ref 10.3–14.5)
SODIUM SERPL-SCNC: 136 MMOL/L — SIGNIFICANT CHANGE UP (ref 135–145)
WBC # BLD: 14.34 K/UL — HIGH (ref 3.8–10.5)
WBC # FLD AUTO: 14.34 K/UL — HIGH (ref 3.8–10.5)

## 2025-06-28 PROCEDURE — 59400 OBSTETRICAL CARE: CPT | Mod: U9

## 2025-06-28 PROCEDURE — 36415 COLL VENOUS BLD VENIPUNCTURE: CPT

## 2025-06-28 PROCEDURE — 85730 THROMBOPLASTIN TIME PARTIAL: CPT

## 2025-06-28 PROCEDURE — 80053 COMPREHEN METABOLIC PANEL: CPT

## 2025-06-28 PROCEDURE — 86850 RBC ANTIBODY SCREEN: CPT

## 2025-06-28 PROCEDURE — 84550 ASSAY OF BLOOD/URIC ACID: CPT

## 2025-06-28 PROCEDURE — 83615 LACTATE (LD) (LDH) ENZYME: CPT

## 2025-06-28 PROCEDURE — 85384 FIBRINOGEN ACTIVITY: CPT

## 2025-06-28 PROCEDURE — 81001 URINALYSIS AUTO W/SCOPE: CPT

## 2025-06-28 PROCEDURE — 85610 PROTHROMBIN TIME: CPT

## 2025-06-28 PROCEDURE — 85025 COMPLETE CBC W/AUTO DIFF WBC: CPT

## 2025-06-28 PROCEDURE — 86900 BLOOD TYPING SEROLOGIC ABO: CPT

## 2025-06-28 PROCEDURE — 86780 TREPONEMA PALLIDUM: CPT

## 2025-06-28 PROCEDURE — 84156 ASSAY OF PROTEIN URINE: CPT

## 2025-06-28 PROCEDURE — 86901 BLOOD TYPING SEROLOGIC RH(D): CPT

## 2025-06-28 PROCEDURE — 85027 COMPLETE CBC AUTOMATED: CPT

## 2025-06-28 PROCEDURE — 82570 ASSAY OF URINE CREATININE: CPT

## 2025-06-28 PROCEDURE — 87800 DETECT AGNT MULT DNA DIREC: CPT

## 2025-06-28 RX ORDER — HYDROCORTISONE 10 MG/G
1 CREAM TOPICAL EVERY 6 HOURS
Refills: 0 | Status: DISCONTINUED | OUTPATIENT
Start: 2025-06-28 | End: 2025-06-29

## 2025-06-28 RX ORDER — PRENATAL 136/IRON/FOLIC ACID 27 MG-1 MG
1 TABLET ORAL DAILY
Refills: 0 | Status: DISCONTINUED | OUTPATIENT
Start: 2025-06-28 | End: 2025-06-29

## 2025-06-28 RX ORDER — DIPHENHYDRAMINE HCL 12.5MG/5ML
25 ELIXIR ORAL EVERY 6 HOURS
Refills: 0 | Status: DISCONTINUED | OUTPATIENT
Start: 2025-06-28 | End: 2025-06-29

## 2025-06-28 RX ORDER — ACETAMINOPHEN 500 MG/5ML
2 LIQUID (ML) ORAL
Qty: 56 | Refills: 0
Start: 2025-06-28 | End: 2025-07-04

## 2025-06-28 RX ORDER — IBUPROFEN 200 MG
1 TABLET ORAL
Qty: 28 | Refills: 0
Start: 2025-06-28 | End: 2025-07-04

## 2025-06-28 RX ORDER — KETOROLAC TROMETHAMINE 30 MG/ML
30 INJECTION, SOLUTION INTRAMUSCULAR; INTRAVENOUS ONCE
Refills: 0 | Status: DISCONTINUED | OUTPATIENT
Start: 2025-06-28 | End: 2025-06-29

## 2025-06-28 RX ORDER — OXYTOCIN-SODIUM CHLORIDE 0.9% IV SOLN 30 UNIT/500ML 30-0.9/5 UT/ML-%
167 SOLUTION INTRAVENOUS
Qty: 30 | Refills: 0 | Status: DISCONTINUED | OUTPATIENT
Start: 2025-06-28 | End: 2025-06-29

## 2025-06-28 RX ORDER — OXYCODONE HYDROCHLORIDE 30 MG/1
5 TABLET ORAL ONCE
Refills: 0 | Status: DISCONTINUED | OUTPATIENT
Start: 2025-06-28 | End: 2025-06-29

## 2025-06-28 RX ORDER — PRAMOXINE HCL 1 %
1 GEL (GRAM) TOPICAL EVERY 4 HOURS
Refills: 0 | Status: DISCONTINUED | OUTPATIENT
Start: 2025-06-28 | End: 2025-06-29

## 2025-06-28 RX ORDER — DIBUCAINE 10 MG/G
1 OINTMENT TOPICAL EVERY 6 HOURS
Refills: 0 | Status: DISCONTINUED | OUTPATIENT
Start: 2025-06-28 | End: 2025-06-29

## 2025-06-28 RX ORDER — WITCH HAZEL LEAF
1 FLUID EXTRACT MISCELLANEOUS EVERY 4 HOURS
Refills: 0 | Status: DISCONTINUED | OUTPATIENT
Start: 2025-06-28 | End: 2025-06-29

## 2025-06-28 RX ORDER — MODIFIED LANOLIN 100 %
1 CREAM (GRAM) TOPICAL EVERY 6 HOURS
Refills: 0 | Status: DISCONTINUED | OUTPATIENT
Start: 2025-06-28 | End: 2025-06-29

## 2025-06-28 RX ORDER — IBUPROFEN 200 MG
600 TABLET ORAL EVERY 6 HOURS
Refills: 0 | Status: DISCONTINUED | OUTPATIENT
Start: 2025-06-28 | End: 2025-06-29

## 2025-06-28 RX ORDER — IBUPROFEN 200 MG
600 TABLET ORAL EVERY 6 HOURS
Refills: 0 | Status: COMPLETED | OUTPATIENT
Start: 2025-06-28 | End: 2026-05-27

## 2025-06-28 RX ORDER — ACETAMINOPHEN 500 MG/5ML
975 LIQUID (ML) ORAL
Refills: 0 | Status: DISCONTINUED | OUTPATIENT
Start: 2025-06-28 | End: 2025-06-29

## 2025-06-28 RX ORDER — BENZOCAINE 220 MG/G
1 SPRAY, METERED PERIODONTAL EVERY 6 HOURS
Refills: 0 | Status: DISCONTINUED | OUTPATIENT
Start: 2025-06-28 | End: 2025-06-29

## 2025-06-28 RX ORDER — OXYCODONE HYDROCHLORIDE 30 MG/1
5 TABLET ORAL
Refills: 0 | Status: DISCONTINUED | OUTPATIENT
Start: 2025-06-28 | End: 2025-06-29

## 2025-06-28 RX ORDER — SIMETHICONE 80 MG
80 TABLET,CHEWABLE ORAL EVERY 4 HOURS
Refills: 0 | Status: DISCONTINUED | OUTPATIENT
Start: 2025-06-28 | End: 2025-06-29

## 2025-06-28 RX ORDER — MAGNESIUM HYDROXIDE 400 MG/5ML
30 SUSPENSION ORAL
Refills: 0 | Status: DISCONTINUED | OUTPATIENT
Start: 2025-06-28 | End: 2025-06-29

## 2025-06-28 RX ADMIN — Medication 600 MILLIGRAM(S): at 06:03

## 2025-06-28 RX ADMIN — Medication 975 MILLIGRAM(S): at 08:43

## 2025-06-28 RX ADMIN — Medication 975 MILLIGRAM(S): at 21:08

## 2025-06-28 RX ADMIN — Medication 3 MILLILITER(S): at 06:03

## 2025-06-28 RX ADMIN — Medication 600 MILLIGRAM(S): at 13:00

## 2025-06-28 RX ADMIN — Medication 600 MILLIGRAM(S): at 18:19

## 2025-06-28 NOTE — DISCHARGE NOTE OB - CARE PROVIDER_API CALL
Ulises Avelar  Obstetrics & Gynecology  [address] [address]  Phone: [phone]  Fax: [phone]  Follow Up Time: 1 week

## 2025-06-28 NOTE — OB RN DELIVERY SUMMARY - NS_SEPSISRSKCALC_OBGYN_ALL_OB_FT
EOS calculated successfully. EOS Risk Factor: 0.5/1000 live births ([facility] [place] national incidence); GA=39w1d; Temp=98.42; ROM=33.533; GBS='Negative'; Antibiotics='No antibiotics or any antibiotics < 2 hrs prior to birth'

## 2025-06-28 NOTE — DISCHARGE NOTE OB - AVOID SEXUAL ACTIVITY UNTIL YOUR POSTPARTUM VISIT
Routing refill request to provider for review/approval because:  B/P out of range    BP Readings from Last 3 Encounters:   02/08/21 (!) 163/78   01/07/21 (!) 166/88   12/22/20 122/64          Statement Selected

## 2025-06-28 NOTE — DISCHARGE NOTE OB - PLAN OF CARE
1) Please monitor BP at home twice daily and call your doctor if >140/90.  2) Please make an appointment with your doctor in 1 week for a blood pressure check and follow up with CardioOB.  3) Please call your doctor sooner if you start experiencing headaches, visual changes, abdominal pain, and/or new onset swelling. 1) Please take ibuprofen and/or Tylenol as needed for pain as prescribed.  2) Nothing in the vagina for 6 weeks (including no sex, no tampons, and no douching).  3) Please call your doctor for a follow up your postpartum appointment in 4-6 weeks.  4) Please continue taking vitamins postpartum.   5) Please call the office sooner if you have heavy vaginal bleeding, severe abdominal pain, or fever > 100.4F.  6) You may resume regular daily activity as tolerated

## 2025-06-28 NOTE — OB PROVIDER DELIVERY SUMMARY - NSSELHIDDEN_OBGYN_ALL_OB_FT
[NS_DeliveryAttending1_OBGYN_ALL_OB_FT:[id number]],[NS_DeliveryAssist1_OBGYN_ALL_OB_FT:[id number]=]

## 2025-06-28 NOTE — DISCHARGE NOTE OB - HOSPITAL COURSE
Patient underwent a  delivery. Post-op course was complicated by preeclampsia without severe features. Pain is well controlled with PRN medication. She has no difficulty with ambulation, voiding, or PO intake. Lab values and vital signs are within normal limits prior to discharge.

## 2025-06-28 NOTE — DISCHARGE NOTE OB - FINANCIAL ASSISTANCE
[facility] provides services at a reduced cost to those who are determined to be eligible through [facility]’s financial assistance program. Information regarding [facility]’s financial assistance program can be found by going to [email or web address] or by calling [phone].

## 2025-06-28 NOTE — DISCHARGE NOTE OB - MEDICATION SUMMARY - MEDICATIONS TO TAKE
I will START or STAY ON the medications listed below when I get home from the hospital:    blood pressure cuff  -- please take your blood pressure twice daily, if >140 and/or >90, please call your doctor  -- Indication: For HTN    ibuprofen 600 mg oral tablet  -- 1 tab(s) by mouth every 6 hours  -- Indication: For pain    Tylenol 325 mg oral tablet  -- 2 tab(s) by mouth every 6 hours  -- Indication: For pain

## 2025-06-28 NOTE — OB PROVIDER DELIVERY SUMMARY - NSPROVIDERDELIVERYNOTE_OBGYN_ALL_OB_FT
, uncomplicated  M infant, apgars 8/9, 3890g  Placenta delivered spontaneously, intact  Vagina and perineum inspected, no lacerations  Fundus firm, hemostasis noted   cc  Count correct  Infant recovering in LDR , uncomplicated  M infant, apgars 8/9, 3890g  Placenta delivered spontaneously, intact  Vagina and perineum inspected, no lacerations  Fundus firm, hemostasis noted   cc  Count correct  Infant recovering in LDR      Attending Addendum:  I was present, gowned and gloved for the  of this 19yo now  s/p uncomplicated . The infant’s head was delivered, AZUL. No nuchal cord noted. The shoulders and body then delivered without difficulty. Pitocin was started for active management of the third stage of labor. The infant was placed on the maternal chest and the cord was clamped and cut after 30 seconds delay. Segment of cord for gas samples obtained. The placenta delivered with gentle traction. The perineum, vulva, and vagina were inspected and noted to have a second degree laceration that was repaired with 2-0 vicryl.  Fundal massage performed and fundus noted to be firm. Excellent hemostasis noted. Placenta examined and was intact. EBL 132ml.  Fabi Franks MD

## 2025-06-28 NOTE — DISCHARGE NOTE OB - CARE PLAN
Principal Discharge DX:	Normal vaginal delivery  Assessment and plan of treatment:	1) Please take ibuprofen and/or Tylenol as needed for pain as prescribed.  2) Nothing in the vagina for 6 weeks (including no sex, no tampons, and no douching).  3) Please call your doctor for a follow up your postpartum appointment in 4-6 weeks.  4) Please continue taking vitamins postpartum.   5) Please call the office sooner if you have heavy vaginal bleeding, severe abdominal pain, or fever > 100.4F.  6) You may resume regular daily activity as tolerated  Secondary Diagnosis:	Preeclampsia  Assessment and plan of treatment:	1) Please monitor BP at home twice daily and call your doctor if >140/90.  2) Please make an appointment with your doctor in 1 week for a blood pressure check and follow up with CardioOB.  3) Please call your doctor sooner if you start experiencing headaches, visual changes, abdominal pain, and/or new onset swelling.   1

## 2025-06-28 NOTE — DISCHARGE NOTE OB - MATERIALS PROVIDED
Breastfeeding Log/Breastfeeding Mother’s Support Group Information/Guide to Postpartum Care/[facility] Hearing Screen Program/Back To Sleep Handout/Breastfeeding Guide and Packet/Birth Certificate Instructions/Tdap Vaccination (VIS Pub Date: January 24, 2012)

## 2025-06-28 NOTE — DISCHARGE NOTE OB - PATIENT PORTAL LINK FT
You can access the FollowMyHealth Patient Portal offered by [facility] by registering at the following website: [email or web address]. By joining [employer]’s FollowMyHealth portal, you will also be able to view your health information using other applications (apps) compatible with our system.

## 2025-06-28 NOTE — OB RN DELIVERY SUMMARY - NSSELHIDDEN_OBGYN_ALL_OB_FT
[NS_DeliveryAttending1_OBGYN_ALL_OB_FT:[id number]],[NS_DeliveryAssist1_OBGYN_ALL_OB_FT:[id number]],[NS_DeliveryRN_OBGYN_ALL_OB_FT:[id number]=]

## 2025-06-29 VITALS
SYSTOLIC BLOOD PRESSURE: 114 MMHG | OXYGEN SATURATION: 94 % | TEMPERATURE: 98 F | HEART RATE: 74 BPM | RESPIRATION RATE: 18 BRPM | DIASTOLIC BLOOD PRESSURE: 77 MMHG

## 2025-06-29 PROCEDURE — 81001 URINALYSIS AUTO W/SCOPE: CPT

## 2025-06-29 PROCEDURE — 85384 FIBRINOGEN ACTIVITY: CPT

## 2025-06-29 PROCEDURE — 36415 COLL VENOUS BLD VENIPUNCTURE: CPT

## 2025-06-29 PROCEDURE — 84550 ASSAY OF BLOOD/URIC ACID: CPT

## 2025-06-29 PROCEDURE — 86900 BLOOD TYPING SEROLOGIC ABO: CPT

## 2025-06-29 PROCEDURE — 83615 LACTATE (LD) (LDH) ENZYME: CPT

## 2025-06-29 PROCEDURE — 86850 RBC ANTIBODY SCREEN: CPT

## 2025-06-29 PROCEDURE — 85027 COMPLETE CBC AUTOMATED: CPT

## 2025-06-29 PROCEDURE — 59050 FETAL MONITOR W/REPORT: CPT

## 2025-06-29 PROCEDURE — 86901 BLOOD TYPING SEROLOGIC RH(D): CPT

## 2025-06-29 PROCEDURE — 80053 COMPREHEN METABOLIC PANEL: CPT

## 2025-06-29 PROCEDURE — 82570 ASSAY OF URINE CREATININE: CPT

## 2025-06-29 PROCEDURE — 85610 PROTHROMBIN TIME: CPT

## 2025-06-29 PROCEDURE — 85730 THROMBOPLASTIN TIME PARTIAL: CPT

## 2025-06-29 PROCEDURE — 85025 COMPLETE CBC W/AUTO DIFF WBC: CPT

## 2025-06-29 PROCEDURE — 86780 TREPONEMA PALLIDUM: CPT

## 2025-06-29 PROCEDURE — T1013: CPT

## 2025-06-29 PROCEDURE — 84156 ASSAY OF PROTEIN URINE: CPT

## 2025-06-29 PROCEDURE — 87800 DETECT AGNT MULT DNA DIREC: CPT

## 2025-06-29 RX ADMIN — Medication 600 MILLIGRAM(S): at 11:50

## 2025-06-29 RX ADMIN — Medication 975 MILLIGRAM(S): at 08:27

## 2025-06-29 RX ADMIN — Medication 600 MILLIGRAM(S): at 05:57

## 2025-06-29 NOTE — PROGRESS NOTE ADULT - ATTENDING COMMENTS
Pt feels well on PPD 2 after  complicated by PEC woSF  Her pain is well controlled and bleeding is light  Pt has remained normotensive since delivery and denies any headache, visual changes or epigastric discomfort  Pt does have bp cuff at home and advised to monitor her bps daily at home.  Parameters to call her primary provider for persistently elevated bps or above symptoms discussed.  Pt verbalized understanding.  Cardio OB referral completed  Pt advised to see Dr Avelar in 1 week or earlier for bp check.  above done using [place] .    SHAYY Russell

## 2025-06-29 NOTE — PROGRESS NOTE ADULT - ASSESSMENT
Assessment/Plan: 20y  now PPD# 2 s/p  at 39w EGA c/b PECwoSF (PC 0.9)    #Routine post partum care  - Stable, doing well postpartum  - Hgb 10.4 > 10.6  - Pain: well controlled on PO pain meds  - GI: regular diet, normal bowel function  - : voiding, lochia decreasing  - DVT ppx: SCDs, ambulation encouraged  - Healthy baby M declines circumcision     #HTN: PECwoSF   - Denies s/sx PEC   - Antihypertensives: not required   - BPs: controlled   - PIH labs wnl   - Discharge with BP cuff and cardio OB referral     Dispo: Patient to be discharged when meeting all postpartum milestones and pending attending approval.
INTERVAL HPI/OVERNIGHT EVENTS:  20y Female s/p labor epidural on 6/27/25    Vital Signs Last 24 Hrs  T(C): 36.9 (28 Jun 2025 04:00), Max: 37.6 (28 Jun 2025 01:42)  T(F): 98.4 (28 Jun 2025 04:00), Max: 99.7 (28 Jun 2025 01:42)  HR: 71 (28 Jun 2025 04:00) (51 - 97)  BP: 110/67 (28 Jun 2025 04:00) (91/52 - 160/74)  BP(mean): --  RR: 18 (28 Jun 2025 04:00) (16 - 19)  SpO2: 97% (28 Jun 2025 04:00) (94% - 99%)    Parameters below as of 28 Jun 2025 04:00  Patient On (Oxygen Delivery Method): room air            Patient's overall anesthesia satisfaction: Positive    Patient doing well     No headache      No residual numbness or weakness, sensory and motor function intact    No anesthetic complications or complaints noted or reported                 
Assessment/Plan:  DAVID RAMIREZ is a 20y  now PPD#1 s/p  at 39w GA, complicated by PECwoSF.     #Routine post partum care  - Stable, doing well postpartum  - Hgb 10.4 > AM labs pending  - Pain: well controlled on PO pain meds  - GI: regular diet, normal bowel function  - : voiding, lochia decreasing  - DVT ppx: ambulation encouraged  - Healthy baby boy, declines circumcision    #PECwoSF  - Normotensive  - No BP meds  - Denies S&S of PEC  - Discharge with cardio OB referral    Dispo: Patient to be discharged when meeting all postpartum milestones and pending attending approval.

## 2025-06-29 NOTE — PROGRESS NOTE ADULT - SUBJECTIVE AND OBJECTIVE BOX
DAVID RAMIREZ is a 20y  now PPD#1 s/p  at 39w GA, complicated by PECwoSF.     Subjective:    No acute events overnight.  Patient has no complaints.  Pain is well controlled.  -flatus, -BM, + voiding.  Ambulating and tolerating PO.  Appropriate lochia.  Denies fever, chills, nausea, and vomiting.  She denies lightheadedness, dizziness, HA, blurry vision, palpitations, chest pain and SOB.     Objective:    T(C): 36.9 (25 @ 04:00), Max: 37.6 (25 @ 01:42)  HR: 71 (25 @ 04:00) (51 - 97)  BP: 110/67 (25 @ 04:00) (91/52 - 160/74)  RR: 18 (25 @ 04:00) (16 - 19)  SpO2: 97% (25 @ 04:00) (94% - 99%)    Physical exam:  General: AOx3, NAD.  Pulm: Normal respiratory effort on RA  Abdomen: Soft, appropriately tender to palpitation, fundus firm.  Ext: No DVT signs, warm extremities.                          10.4   7.43  )-----------( 216      ( 2025 22:40 )             31.4         134[L]  |  104  |  7.9[L]  ----------------------------<  87  3.9   |  17.0[L]  |  0.46[L]    Ca    8.1[L]      2025 22:40    TPro  5.7[L]  /  Alb  3.1[L]  /  TBili  <0.2[L]  /  DBili  x   /  AST  11  /  ALT  6   /  AlkPhos  178[H]          
DAVID RAMIREZ is a 20y  now PPD# 2 s/p  at 39w EGA c/b PECwoSF    Subjective:    No acute events overnight.  Patient has no complaints.  Pain is well controlled.  +flatus, + voiding.  Ambulating and tolerating PO.  Appropriate lochia.  Denies fever, chills, nausea, and vomiting.  She denies lightheadedness, dizziness, HA, blurry vision, palpitations, chest pain and SOB.     Objective:    T(C): 36.8 (25 @ 04:00), Max: 36.8 (25 @ 15:35)  HR: 74 (25 @ 04:00) (74 - 75)  BP: 114/77 (25 @ 04:00) (111/73 - 114/77)  RR: 18 (25 @ 04:00) (18 - 18)  SpO2: 94% (25 @ 04:00) (94% - 99%)    Physical exam:  General: AOx3, NAD.  Abdomen: Soft, appropriately tender to palpitation, fundus firm.  Vaginal: expectant lochia  Ext: No DVT signs, warm extremities.                          10.6   14.34 )-----------( 222      ( 2025 06:10 )             32.3         136  |  104  |  7.5[L]  ----------------------------<  81  3.8   |  20.0[L]  |  0.51    Ca    8.6      2025 06:10    TPro  5.3[L]  /  Alb  2.9[L]  /  TBili  0.5  /  DBili  x   /  AST  23  /  ALT  6   /  AlkPhos  164[H]

## 2025-07-02 ENCOUNTER — APPOINTMENT (OUTPATIENT)
Dept: OBGYN | Facility: CLINIC | Age: 20
End: 2025-07-02
Payer: MEDICAID

## 2025-07-02 VITALS
BODY MASS INDEX: 29.12 KG/M2 | DIASTOLIC BLOOD PRESSURE: 66 MMHG | HEIGHT: 62 IN | SYSTOLIC BLOOD PRESSURE: 100 MMHG | WEIGHT: 158.25 LBS

## 2025-07-02 PROCEDURE — 99459 PELVIC EXAMINATION: CPT | Mod: NC

## 2025-07-02 PROCEDURE — 99214 OFFICE O/P EST MOD 30 MIN: CPT | Mod: 24

## 2025-07-02 RX ORDER — AMOXICILLIN AND CLAVULANATE POTASSIUM 875; 125 MG/1; MG/1
875-125 TABLET, COATED ORAL
Qty: 14 | Refills: 0 | Status: ACTIVE | COMMUNITY
Start: 2025-07-02 | End: 1900-01-01

## 2025-07-02 RX ORDER — IBUPROFEN 600 MG/1
600 TABLET, FILM COATED ORAL EVERY 6 HOURS
Qty: 1 | Refills: 0 | Status: ACTIVE | COMMUNITY
Start: 2025-07-02 | End: 1900-01-01

## 2025-07-10 ENCOUNTER — APPOINTMENT (OUTPATIENT)
Dept: CARDIOLOGY | Facility: CLINIC | Age: 20
End: 2025-07-10

## 2025-07-10 LAB — CULTURE, WOUND AEROBE ANAEROBE: ABNORMAL

## 2025-07-15 ENCOUNTER — APPOINTMENT (OUTPATIENT)
Dept: OBGYN | Facility: CLINIC | Age: 20
End: 2025-07-15
Payer: MEDICAID

## 2025-07-15 VITALS
DIASTOLIC BLOOD PRESSURE: 72 MMHG | SYSTOLIC BLOOD PRESSURE: 102 MMHG | WEIGHT: 147 LBS | BODY MASS INDEX: 27.05 KG/M2 | HEIGHT: 62 IN

## 2025-07-15 PROBLEM — Z01.419 ENCOUNTER FOR ANNUAL ROUTINE GYNECOLOGICAL EXAMINATION: Status: ACTIVE | Noted: 2025-07-15

## 2025-07-15 PROCEDURE — 0503F POSTPARTUM CARE VISIT: CPT

## 2025-07-16 ENCOUNTER — APPOINTMENT (OUTPATIENT)
Dept: OBGYN | Facility: CLINIC | Age: 20
End: 2025-07-16

## 2025-08-13 ENCOUNTER — APPOINTMENT (OUTPATIENT)
Dept: OBGYN | Facility: CLINIC | Age: 20
End: 2025-08-13
Payer: MEDICAID

## 2025-08-13 VITALS
BODY MASS INDEX: 26.39 KG/M2 | DIASTOLIC BLOOD PRESSURE: 64 MMHG | WEIGHT: 143.38 LBS | HEIGHT: 62 IN | SYSTOLIC BLOOD PRESSURE: 102 MMHG

## 2025-08-13 DIAGNOSIS — Z30.017 ENCOUNTER FOR INITIAL PRESCRIPTION OF IMPLANTABLE SUBDERMAL CONTRACEPTIVE: ICD-10-CM

## 2025-08-13 LAB
HCG UR QL: NEGATIVE
QUALITY CONTROL: YES

## 2025-08-13 PROCEDURE — 11981 INSERTION DRUG DLVR IMPLANT: CPT

## 2025-08-13 PROCEDURE — 81025 URINE PREGNANCY TEST: CPT

## 2025-08-20 ENCOUNTER — EMERGENCY (EMERGENCY)
Facility: HOSPITAL | Age: 20
LOS: 1 days | End: 2025-08-20
Attending: EMERGENCY MEDICINE
Payer: COMMERCIAL

## 2025-08-20 VITALS
WEIGHT: 141.1 LBS | TEMPERATURE: 98 F | SYSTOLIC BLOOD PRESSURE: 105 MMHG | RESPIRATION RATE: 18 BRPM | HEART RATE: 71 BPM | DIASTOLIC BLOOD PRESSURE: 73 MMHG | HEIGHT: 62 IN | OXYGEN SATURATION: 99 %

## 2025-08-20 VITALS
SYSTOLIC BLOOD PRESSURE: 96 MMHG | HEART RATE: 76 BPM | DIASTOLIC BLOOD PRESSURE: 61 MMHG | OXYGEN SATURATION: 98 % | TEMPERATURE: 99 F | RESPIRATION RATE: 19 BRPM

## 2025-08-20 DIAGNOSIS — Z98.890 OTHER SPECIFIED POSTPROCEDURAL STATES: Chronic | ICD-10-CM

## 2025-08-20 LAB
ALBUMIN SERPL ELPH-MCNC: 4.2 G/DL — SIGNIFICANT CHANGE UP (ref 3.3–5.2)
ALP SERPL-CCNC: 121 U/L — HIGH (ref 40–120)
ALT FLD-CCNC: 16 U/L — SIGNIFICANT CHANGE UP
ANION GAP SERPL CALC-SCNC: 11 MMOL/L — SIGNIFICANT CHANGE UP (ref 5–17)
APPEARANCE UR: CLEAR — SIGNIFICANT CHANGE UP
AST SERPL-CCNC: 19 U/L — SIGNIFICANT CHANGE UP
BACTERIA # UR AUTO: NEGATIVE /HPF — SIGNIFICANT CHANGE UP
BASOPHILS # BLD AUTO: 0.03 K/UL — SIGNIFICANT CHANGE UP (ref 0–0.2)
BASOPHILS NFR BLD AUTO: 0.4 % — SIGNIFICANT CHANGE UP (ref 0–2)
BILIRUB SERPL-MCNC: 0.5 MG/DL — SIGNIFICANT CHANGE UP (ref 0.4–2)
BILIRUB UR-MCNC: NEGATIVE — SIGNIFICANT CHANGE UP
BUN SERPL-MCNC: 9.9 MG/DL — SIGNIFICANT CHANGE UP (ref 8–20)
CALCIUM SERPL-MCNC: 8.8 MG/DL — SIGNIFICANT CHANGE UP (ref 8.4–10.5)
CAST: 1 /LPF — SIGNIFICANT CHANGE UP (ref 0–4)
CHLORIDE SERPL-SCNC: 105 MMOL/L — SIGNIFICANT CHANGE UP (ref 96–108)
CO2 SERPL-SCNC: 21 MMOL/L — LOW (ref 22–29)
COLOR SPEC: YELLOW — SIGNIFICANT CHANGE UP
CREAT SERPL-MCNC: 0.5 MG/DL — SIGNIFICANT CHANGE UP (ref 0.5–1.3)
DIFF PNL FLD: NEGATIVE — SIGNIFICANT CHANGE UP
EGFR: 137 ML/MIN/1.73M2 — SIGNIFICANT CHANGE UP
EGFR: 137 ML/MIN/1.73M2 — SIGNIFICANT CHANGE UP
EOSINOPHIL # BLD AUTO: 0.19 K/UL — SIGNIFICANT CHANGE UP (ref 0–0.5)
EOSINOPHIL NFR BLD AUTO: 2.3 % — SIGNIFICANT CHANGE UP (ref 0–6)
GLUCOSE SERPL-MCNC: 123 MG/DL — HIGH (ref 70–99)
GLUCOSE UR QL: NEGATIVE MG/DL — SIGNIFICANT CHANGE UP
HCG SERPL-ACNC: <4 MIU/ML — SIGNIFICANT CHANGE UP
HCT VFR BLD CALC: 39.5 % — SIGNIFICANT CHANGE UP (ref 34.5–45)
HGB BLD-MCNC: 12.9 G/DL — SIGNIFICANT CHANGE UP (ref 11.5–15.5)
IMM GRANULOCYTES # BLD AUTO: 0.04 K/UL — SIGNIFICANT CHANGE UP (ref 0–0.07)
IMM GRANULOCYTES NFR BLD AUTO: 0.5 % — SIGNIFICANT CHANGE UP (ref 0–0.9)
KETONES UR QL: 15 MG/DL
LEUKOCYTE ESTERASE UR-ACNC: NEGATIVE — SIGNIFICANT CHANGE UP
LIDOCAIN IGE QN: 32 U/L — SIGNIFICANT CHANGE UP (ref 22–51)
LYMPHOCYTES # BLD AUTO: 1.69 K/UL — SIGNIFICANT CHANGE UP (ref 1–3.3)
LYMPHOCYTES NFR BLD AUTO: 20.7 % — SIGNIFICANT CHANGE UP (ref 13–44)
MCHC RBC-ENTMCNC: 27.5 PG — SIGNIFICANT CHANGE UP (ref 27–34)
MCHC RBC-ENTMCNC: 32.7 G/DL — SIGNIFICANT CHANGE UP (ref 32–36)
MCV RBC AUTO: 84.2 FL — SIGNIFICANT CHANGE UP (ref 80–100)
MONOCYTES # BLD AUTO: 0.41 K/UL — SIGNIFICANT CHANGE UP (ref 0–0.9)
MONOCYTES NFR BLD AUTO: 5 % — SIGNIFICANT CHANGE UP (ref 2–14)
NEUTROPHILS # BLD AUTO: 5.79 K/UL — SIGNIFICANT CHANGE UP (ref 1.8–7.4)
NEUTROPHILS NFR BLD AUTO: 71.1 % — SIGNIFICANT CHANGE UP (ref 43–77)
NITRITE UR-MCNC: NEGATIVE — SIGNIFICANT CHANGE UP
NRBC # BLD AUTO: 0 K/UL — SIGNIFICANT CHANGE UP (ref 0–0)
NRBC # FLD: 0 K/UL — SIGNIFICANT CHANGE UP (ref 0–0)
NRBC BLD AUTO-RTO: 0 /100 WBCS — SIGNIFICANT CHANGE UP (ref 0–0)
PH UR: 6 — SIGNIFICANT CHANGE UP (ref 5–8)
PLATELET # BLD AUTO: 407 K/UL — HIGH (ref 150–400)
PMV BLD: 10 FL — SIGNIFICANT CHANGE UP (ref 7–13)
POTASSIUM SERPL-MCNC: 4.4 MMOL/L — SIGNIFICANT CHANGE UP (ref 3.5–5.3)
POTASSIUM SERPL-SCNC: 4.4 MMOL/L — SIGNIFICANT CHANGE UP (ref 3.5–5.3)
PROT SERPL-MCNC: 7.3 G/DL — SIGNIFICANT CHANGE UP (ref 6.6–8.7)
PROT UR-MCNC: 30 MG/DL
RBC # BLD: 4.69 M/UL — SIGNIFICANT CHANGE UP (ref 3.8–5.2)
RBC # FLD: 15.1 % — HIGH (ref 10.3–14.5)
RBC CASTS # UR COMP ASSIST: 2 /HPF — SIGNIFICANT CHANGE UP (ref 0–4)
SODIUM SERPL-SCNC: 137 MMOL/L — SIGNIFICANT CHANGE UP (ref 135–145)
SP GR SPEC: >1.03 — HIGH (ref 1–1.03)
SQUAMOUS # UR AUTO: 10 /HPF — HIGH (ref 0–5)
UROBILINOGEN FLD QL: 1 MG/DL — SIGNIFICANT CHANGE UP (ref 0.2–1)
WBC # BLD: 8.15 K/UL — SIGNIFICANT CHANGE UP (ref 3.8–10.5)
WBC # FLD AUTO: 8.15 K/UL — SIGNIFICANT CHANGE UP (ref 3.8–10.5)
WBC UR QL: 13 /HPF — HIGH (ref 0–5)

## 2025-08-20 PROCEDURE — 96374 THER/PROPH/DIAG INJ IV PUSH: CPT

## 2025-08-20 PROCEDURE — 36415 COLL VENOUS BLD VENIPUNCTURE: CPT

## 2025-08-20 PROCEDURE — 83690 ASSAY OF LIPASE: CPT

## 2025-08-20 PROCEDURE — 84702 CHORIONIC GONADOTROPIN TEST: CPT

## 2025-08-20 PROCEDURE — 76705 ECHO EXAM OF ABDOMEN: CPT

## 2025-08-20 PROCEDURE — 99284 EMERGENCY DEPT VISIT MOD MDM: CPT | Mod: 25

## 2025-08-20 PROCEDURE — 99284 EMERGENCY DEPT VISIT MOD MDM: CPT

## 2025-08-20 PROCEDURE — 76705 ECHO EXAM OF ABDOMEN: CPT | Mod: 26

## 2025-08-20 PROCEDURE — 71045 X-RAY EXAM CHEST 1 VIEW: CPT | Mod: 26

## 2025-08-20 PROCEDURE — 85025 COMPLETE CBC W/AUTO DIFF WBC: CPT

## 2025-08-20 PROCEDURE — 71045 X-RAY EXAM CHEST 1 VIEW: CPT

## 2025-08-20 PROCEDURE — 80053 COMPREHEN METABOLIC PANEL: CPT

## 2025-08-20 PROCEDURE — 81001 URINALYSIS AUTO W/SCOPE: CPT

## 2025-08-20 PROCEDURE — T1013: CPT

## 2025-08-20 PROCEDURE — 87086 URINE CULTURE/COLONY COUNT: CPT

## 2025-08-20 RX ORDER — CEPHALEXIN 250 MG/1
1 CAPSULE ORAL
Qty: 5 | Refills: 0
Start: 2025-08-20 | End: 2025-08-24

## 2025-08-20 RX ORDER — CEPHALEXIN 250 MG/1
1 CAPSULE ORAL
Qty: 15 | Refills: 0
Start: 2025-08-20 | End: 2025-08-24

## 2025-08-20 RX ORDER — ACETAMINOPHEN 500 MG/5ML
1000 LIQUID (ML) ORAL ONCE
Refills: 0 | Status: COMPLETED | OUTPATIENT
Start: 2025-08-20 | End: 2025-08-20

## 2025-08-20 RX ADMIN — Medication 400 MILLIGRAM(S): at 14:19

## 2025-08-22 LAB
CULTURE RESULTS: SIGNIFICANT CHANGE UP
SPECIMEN SOURCE: SIGNIFICANT CHANGE UP